# Patient Record
Sex: FEMALE | Race: WHITE | NOT HISPANIC OR LATINO | URBAN - NONMETROPOLITAN AREA
[De-identification: names, ages, dates, MRNs, and addresses within clinical notes are randomized per-mention and may not be internally consistent; named-entity substitution may affect disease eponyms.]

---

## 2020-08-18 ENCOUNTER — TELEMEDICINE (OUTPATIENT)
Dept: FAMILY MEDICINE CLINIC | Facility: CLINIC | Age: 60
End: 2020-08-18

## 2020-08-18 DIAGNOSIS — M25.551 RIGHT HIP PAIN: ICD-10-CM

## 2020-08-18 DIAGNOSIS — F17.210 CIGARETTE SMOKER MOTIVATED TO QUIT: ICD-10-CM

## 2020-08-18 DIAGNOSIS — G89.29 CHRONIC BILATERAL LOW BACK PAIN WITH BILATERAL SCIATICA: Primary | ICD-10-CM

## 2020-08-18 DIAGNOSIS — M54.42 CHRONIC BILATERAL LOW BACK PAIN WITH BILATERAL SCIATICA: Primary | ICD-10-CM

## 2020-08-18 DIAGNOSIS — M54.41 CHRONIC BILATERAL LOW BACK PAIN WITH BILATERAL SCIATICA: Primary | ICD-10-CM

## 2020-08-18 DIAGNOSIS — I10 ESSENTIAL HYPERTENSION: ICD-10-CM

## 2020-08-18 PROBLEM — S22.070A COMPRESSION FRACTURE OF T10 VERTEBRA: Status: ACTIVE | Noted: 2020-03-03

## 2020-08-18 PROBLEM — K86.89 PANCREATIC MASS: Status: ACTIVE | Noted: 2020-02-06

## 2020-08-18 PROCEDURE — 99203 OFFICE O/P NEW LOW 30 MIN: CPT | Performed by: FAMILY MEDICINE

## 2020-08-18 RX ORDER — HYDROCODONE BITARTRATE AND ACETAMINOPHEN 7.5; 325 MG/1; MG/1
1 TABLET ORAL EVERY 6 HOURS PRN
Qty: 120 TABLET | Refills: 0 | Status: SHIPPED | OUTPATIENT
Start: 2020-08-18 | End: 2020-09-21 | Stop reason: SDUPTHER

## 2020-08-18 RX ORDER — ALBUTEROL SULFATE 90 UG/1
2 AEROSOL, METERED RESPIRATORY (INHALATION)
COMMUNITY
Start: 2020-01-07 | End: 2021-01-07

## 2020-08-18 RX ORDER — RISEDRONATE SODIUM 150 MG/1
150 TABLET, FILM COATED ORAL
COMMUNITY
Start: 2020-08-12 | End: 2022-05-31

## 2020-08-18 RX ORDER — VARENICLINE TARTRATE 1 MG/1
1 TABLET, FILM COATED ORAL 2 TIMES DAILY
Qty: 60 TABLET | Refills: 5 | Status: SHIPPED | OUTPATIENT
Start: 2020-08-18 | End: 2021-02-22

## 2020-08-18 RX ORDER — MIRTAZAPINE 7.5 MG/1
7.5 TABLET, FILM COATED ORAL
COMMUNITY
Start: 2020-07-20 | End: 2022-05-03

## 2020-08-18 RX ORDER — HYDROCODONE BITARTRATE AND ACETAMINOPHEN 5; 325 MG/1; MG/1
1 TABLET ORAL EVERY 6 HOURS PRN
COMMUNITY
End: 2020-08-18

## 2020-08-18 RX ORDER — BUPROPION HYDROCHLORIDE 150 MG/1
150 TABLET ORAL DAILY
Qty: 30 TABLET | Refills: 5 | Status: SHIPPED | OUTPATIENT
Start: 2020-08-18 | End: 2020-09-21

## 2020-08-18 RX ORDER — METOPROLOL SUCCINATE 25 MG/1
12.5 TABLET, EXTENDED RELEASE ORAL
COMMUNITY
Start: 2020-01-07 | End: 2020-11-17 | Stop reason: SDUPTHER

## 2020-08-18 RX ORDER — ONDANSETRON 4 MG/1
4 TABLET, FILM COATED ORAL EVERY 8 HOURS PRN
COMMUNITY
End: 2021-10-20 | Stop reason: SDUPTHER

## 2020-08-18 NOTE — PROGRESS NOTES
Subjective   Carin Emmanuel is a 60 y.o. female.   Seen today by video visit due to the Covid-19 quarantine.   Patient with breast cancer, establishing care with me today.  Completed treatment for breast cancer almost 5 years ago now.  Recent mammogram was good, clear of cancer.    She is very depressed.  She took zoloft in the past but made her tired and did not help.  She responded very well to Wellbutrin years ago and wants to try this again.    She is having a lot of joint pain and recently her right  hip have really been bothering her. She had vertebral compression fractures of two thoracic vertebrae and had kyphoplasty.  She is scheduled to see the orthopedic surgeon about her hip, the same one who did her back surgery.    She wants to quit smoking and is wanting to try Chantix.     History of Present Illness    The following portions of the patient's history were reviewed and updated as appropriate: allergies, current medications, past family history, past medical history, past social history, past surgical history and problem list.    Review of Systems   Constitutional: Negative.    HENT: Negative.    Respiratory: Negative.  Negative for shortness of breath.    Cardiovascular: Negative.  Negative for chest pain.   Gastrointestinal: Negative.    Musculoskeletal: Negative.  Negative for myalgias.   Skin: Negative.    Allergic/Immunologic: Negative for immunocompromised state.   Neurological: Negative for dizziness, tremors, seizures, syncope, weakness and numbness.   Hematological: Negative.    Psychiatric/Behavioral: Negative for agitation, confusion, dysphoric mood and sleep disturbance. The patient is not nervous/anxious.      Objective   Physical Exam    Assessment/Plan   Carin was seen today for establish care.    Diagnoses and all orders for this visit:    Chronic bilateral low back pain with bilateral sciatica  -     HYDROcodone-acetaminophen (Norco) 7.5-325 MG per tablet; Take 1 tablet by mouth Every  6 (Six) Hours As Needed for Moderate Pain .    Right hip pain  -     HYDROcodone-acetaminophen (Norco) 7.5-325 MG per tablet; Take 1 tablet by mouth Every 6 (Six) Hours As Needed for Moderate Pain .    Cigarette smoker motivated to quit  -     varenicline (Chantix Starting Month Pak) 0.5 MG X 11 & 1 MG X 42 tablet; Take 0.5 mg one daily on days 1-3 and and 0.5 mg twice daily on days 4-7.Then 1 mg twice daily for a total of 12 weeks.  -     varenicline (Chantix Continuing Month Pak) 1 MG tablet; Take 1 tablet by mouth 2 (Two) Times a Day.    Essential hypertension    Other orders  -     buPROPion XL (Wellbutrin XL) 150 MG 24 hr tablet; Take 1 tablet by mouth Daily.    The patient has read and signed the [facility] Controlled Substance Contract.  I will continue to see patient for regular follow up appointments. Patient is well controlled on the medication.  SAIDA has been reviewed by me and is updated every 3 months. The patient is aware of the potential for addiction and dependence.      Will add Wellbutrin for depression as she responded well to this in the past    She will follow-up with oncology as scheduled regarding breast cancer    Chantix is started for smoking cessation    We will increase the Norco to 7.5 mg for multiple pain issues including the hip pain, back pain, and she will follow-up with orthopedics as scheduled.   Continue current antihypertensive medication    Continue Actonel along with calcium and vitamin D for osteoporosis.        This document has been electronically signed by Mari Bacon MD on August 18, 2020 12:21

## 2020-09-21 DIAGNOSIS — M54.42 CHRONIC BILATERAL LOW BACK PAIN WITH BILATERAL SCIATICA: ICD-10-CM

## 2020-09-21 DIAGNOSIS — M25.551 RIGHT HIP PAIN: ICD-10-CM

## 2020-09-21 DIAGNOSIS — G89.29 CHRONIC BILATERAL LOW BACK PAIN WITH BILATERAL SCIATICA: ICD-10-CM

## 2020-09-21 DIAGNOSIS — M54.41 CHRONIC BILATERAL LOW BACK PAIN WITH BILATERAL SCIATICA: ICD-10-CM

## 2020-09-21 RX ORDER — HYDROCODONE BITARTRATE AND ACETAMINOPHEN 7.5; 325 MG/1; MG/1
1 TABLET ORAL EVERY 6 HOURS PRN
Qty: 120 TABLET | Refills: 0 | Status: SHIPPED | OUTPATIENT
Start: 2020-09-21 | End: 2020-10-21 | Stop reason: SDUPTHER

## 2020-09-21 RX ORDER — BUPROPION HYDROCHLORIDE 300 MG/1
300 TABLET ORAL DAILY
Qty: 30 TABLET | Refills: 5 | Status: SHIPPED | OUTPATIENT
Start: 2020-09-21 | End: 2021-04-05 | Stop reason: SDUPTHER

## 2020-10-21 DIAGNOSIS — M54.41 CHRONIC BILATERAL LOW BACK PAIN WITH BILATERAL SCIATICA: ICD-10-CM

## 2020-10-21 DIAGNOSIS — G89.29 CHRONIC BILATERAL LOW BACK PAIN WITH BILATERAL SCIATICA: ICD-10-CM

## 2020-10-21 DIAGNOSIS — M54.42 CHRONIC BILATERAL LOW BACK PAIN WITH BILATERAL SCIATICA: ICD-10-CM

## 2020-10-21 DIAGNOSIS — M25.551 RIGHT HIP PAIN: ICD-10-CM

## 2020-10-21 RX ORDER — HYDROCODONE BITARTRATE AND ACETAMINOPHEN 7.5; 325 MG/1; MG/1
1 TABLET ORAL EVERY 6 HOURS PRN
Qty: 120 TABLET | Refills: 0 | Status: SHIPPED | OUTPATIENT
Start: 2020-10-21 | End: 2020-11-17 | Stop reason: SDUPTHER

## 2020-11-17 ENCOUNTER — TELEMEDICINE (OUTPATIENT)
Dept: FAMILY MEDICINE CLINIC | Facility: CLINIC | Age: 60
End: 2020-11-17

## 2020-11-17 DIAGNOSIS — M54.42 CHRONIC BILATERAL LOW BACK PAIN WITH BILATERAL SCIATICA: Primary | ICD-10-CM

## 2020-11-17 DIAGNOSIS — G89.29 CHRONIC BILATERAL LOW BACK PAIN WITH BILATERAL SCIATICA: Primary | ICD-10-CM

## 2020-11-17 DIAGNOSIS — M25.551 RIGHT HIP PAIN: ICD-10-CM

## 2020-11-17 DIAGNOSIS — K21.9 GASTROESOPHAGEAL REFLUX DISEASE, UNSPECIFIED WHETHER ESOPHAGITIS PRESENT: ICD-10-CM

## 2020-11-17 DIAGNOSIS — E78.2 MIXED HYPERLIPIDEMIA: ICD-10-CM

## 2020-11-17 DIAGNOSIS — M54.41 CHRONIC BILATERAL LOW BACK PAIN WITH BILATERAL SCIATICA: Primary | ICD-10-CM

## 2020-11-17 DIAGNOSIS — C50.512 MALIGNANT NEOPLASM OF LOWER-OUTER QUADRANT OF LEFT FEMALE BREAST, UNSPECIFIED ESTROGEN RECEPTOR STATUS (HCC): ICD-10-CM

## 2020-11-17 DIAGNOSIS — I10 ESSENTIAL HYPERTENSION: ICD-10-CM

## 2020-11-17 DIAGNOSIS — M81.0 OSTEOPOROSIS, UNSPECIFIED OSTEOPOROSIS TYPE, UNSPECIFIED PATHOLOGICAL FRACTURE PRESENCE: ICD-10-CM

## 2020-11-17 PROBLEM — J44.9 CHRONIC OBSTRUCTIVE PULMONARY DISEASE: Status: ACTIVE | Noted: 2020-08-20

## 2020-11-17 PROCEDURE — 99214 OFFICE O/P EST MOD 30 MIN: CPT | Performed by: FAMILY MEDICINE

## 2020-11-17 RX ORDER — SIMVASTATIN 20 MG
20 TABLET ORAL
COMMUNITY
Start: 2020-10-02 | End: 2023-03-09

## 2020-11-17 RX ORDER — ASPIRIN 81 MG/1
81 TABLET, CHEWABLE ORAL DAILY
COMMUNITY
Start: 2020-10-02 | End: 2021-10-03

## 2020-11-17 RX ORDER — PANTOPRAZOLE SODIUM 40 MG/1
TABLET, DELAYED RELEASE ORAL
COMMUNITY
Start: 2020-08-24

## 2020-11-17 RX ORDER — HYDROCODONE BITARTRATE AND ACETAMINOPHEN 7.5; 325 MG/1; MG/1
1 TABLET ORAL EVERY 6 HOURS PRN
Qty: 120 TABLET | Refills: 0 | Status: SHIPPED | OUTPATIENT
Start: 2020-11-17 | End: 2020-12-21 | Stop reason: SDUPTHER

## 2020-11-17 RX ORDER — METOPROLOL SUCCINATE 25 MG/1
12.5 TABLET, EXTENDED RELEASE ORAL DAILY
Qty: 30 TABLET | Refills: 5 | Status: SHIPPED | OUTPATIENT
Start: 2020-11-17 | End: 2022-04-04

## 2020-11-17 NOTE — PROGRESS NOTES
Subjective   Carin Emmanuel is a 60 y.o. female.   Seen today by video visit due to the Covid-19 quarantine.  Patient with breast cancer, osteoporosis.  History of several compression fractures.  Patient had back surgery earlier this year.  She is on pain medication, needs refills.  Has been noticing increasing pain that goes down her legs.  The pain starts in the low back and shoots down both legs.  Is been going on for at least 3 months.  Needs refills of medications for lipids, hypertension, and reflux.    History of Present Illness  Back Pain   This is a chronic problem. The current episode started more than 1 year ago. The problem occurs constantly. The problem is unchanged. The pain is present in the lumbar spine. The quality of the pain is described as shooting, stabbing and aching. The pain radiates down the back of both legs The pain is at a severity of 8/10. The pain is severe. The pain is the same all the time. The symptoms are aggravated by standing, twisting, position, bending and sitting. Stiffness is present at night, all day and in the morning. Associated symptoms include leg pain and tingling. Pertinent negatives include no abdominal pain, bladder incontinence, bowel incontinence, chest pain, dysuria, fever, headaches, numbness, paresis, paresthesias, pelvic pain, perianal numbness, weakness or weight loss.  Patient has tried analgesics, NSAIDs, muscle relaxant and heat for the symptoms. The treatment provided mild relief.     The following portions of the patient's history were reviewed and updated as appropriate: allergies, current medications, past family history, past medical history, past social history, past surgical history and problem list.    Review of Systems   Constitutional: Negative.    HENT: Negative.    Respiratory: Negative.  Negative for shortness of breath.    Cardiovascular: Negative.  Negative for chest pain.   Gastrointestinal: Negative.    Musculoskeletal: Negative.  Negative  for myalgias.   Skin: Negative.    Allergic/Immunologic: Negative for immunocompromised state.   Neurological: Negative for dizziness, tremors, seizures, syncope, weakness and numbness.   Hematological: Negative.    Psychiatric/Behavioral: Negative for agitation, confusion, dysphoric mood and sleep disturbance. The patient is not nervous/anxious.    All other systems reviewed and are negative.      Objective   Physical Exam    Assessment/Plan   Diagnoses and all orders for this visit:    1. Chronic bilateral low back pain with bilateral sciatica (Primary)  -     HYDROcodone-acetaminophen (Norco) 7.5-325 MG per tablet; Take 1 tablet by mouth Every 6 (Six) Hours As Needed for Moderate Pain .  Dispense: 120 tablet; Refill: 0    2. Right hip pain    3. Osteoporosis, unspecified osteoporosis type, unspecified pathological fracture presence    4. Malignant neoplasm of lower-outer quadrant of left female breast, unspecified estrogen receptor status (CMS/HCC)    5. Essential hypertension  -     metoprolol succinate XL (TOPROL-XL) 25 MG 24 hr tablet; Take 0.5 tablets by mouth Daily.  Dispense: 30 tablet; Refill: 5    6. Mixed hyperlipidemia    7. Gastroesophageal reflux disease, unspecified whether esophagitis present    The patient has read and signed the [facility] Controlled Substance Contract.  I will continue to see patient for regular follow up appointments. Patient is well controlled on the medication.  SAIDA has been reviewed by me and is updated every 3 months. The patient is aware of the potential for addiction and dependence.   Continue above listed medicines for reflux hypertension and hyperlipidemia    She will follow up with surgery regarding breast cancer    Continue Norco for pain management of above issues    Continue with current treatment for osteoporosis    We will get x-ray of the lumbar spine may need to consider further studies given history of previous compression fractures and  osteoporosis.        This document has been electronically signed by Mari Bacon MD on November 17, 2020 17:20 CST

## 2020-12-21 DIAGNOSIS — M54.41 CHRONIC BILATERAL LOW BACK PAIN WITH BILATERAL SCIATICA: ICD-10-CM

## 2020-12-21 DIAGNOSIS — G89.29 CHRONIC BILATERAL LOW BACK PAIN WITH BILATERAL SCIATICA: ICD-10-CM

## 2020-12-21 DIAGNOSIS — M54.42 CHRONIC BILATERAL LOW BACK PAIN WITH BILATERAL SCIATICA: ICD-10-CM

## 2020-12-21 RX ORDER — HYDROCODONE BITARTRATE AND ACETAMINOPHEN 7.5; 325 MG/1; MG/1
1 TABLET ORAL EVERY 6 HOURS PRN
Qty: 120 TABLET | Refills: 0 | Status: SHIPPED | OUTPATIENT
Start: 2020-12-21 | End: 2021-01-20 | Stop reason: SDUPTHER

## 2021-01-20 DIAGNOSIS — M54.41 CHRONIC BILATERAL LOW BACK PAIN WITH BILATERAL SCIATICA: ICD-10-CM

## 2021-01-20 DIAGNOSIS — G89.29 CHRONIC BILATERAL LOW BACK PAIN WITH BILATERAL SCIATICA: ICD-10-CM

## 2021-01-20 DIAGNOSIS — M54.42 CHRONIC BILATERAL LOW BACK PAIN WITH BILATERAL SCIATICA: ICD-10-CM

## 2021-01-20 RX ORDER — HYDROCODONE BITARTRATE AND ACETAMINOPHEN 7.5; 325 MG/1; MG/1
1 TABLET ORAL EVERY 6 HOURS PRN
Qty: 120 TABLET | Refills: 0 | Status: SHIPPED | OUTPATIENT
Start: 2021-01-20 | End: 2021-02-22

## 2021-02-22 ENCOUNTER — OFFICE VISIT (OUTPATIENT)
Dept: FAMILY MEDICINE CLINIC | Facility: CLINIC | Age: 61
End: 2021-02-22

## 2021-02-22 DIAGNOSIS — M54.42 CHRONIC BILATERAL LOW BACK PAIN WITH BILATERAL SCIATICA: ICD-10-CM

## 2021-02-22 DIAGNOSIS — D49.1 NEOPLASM OF UPPER LOBE OF RIGHT LUNG: ICD-10-CM

## 2021-02-22 DIAGNOSIS — C50.512 MALIGNANT NEOPLASM OF LOWER-OUTER QUADRANT OF LEFT FEMALE BREAST, UNSPECIFIED ESTROGEN RECEPTOR STATUS (HCC): Primary | ICD-10-CM

## 2021-02-22 DIAGNOSIS — G89.29 CHRONIC BILATERAL LOW BACK PAIN WITH BILATERAL SCIATICA: ICD-10-CM

## 2021-02-22 DIAGNOSIS — M54.41 CHRONIC BILATERAL LOW BACK PAIN WITH BILATERAL SCIATICA: ICD-10-CM

## 2021-02-22 PROBLEM — R91.1 LUNG NODULE: Status: ACTIVE | Noted: 2021-02-16

## 2021-02-22 PROCEDURE — 99442 PR PHYS/QHP TELEPHONE EVALUATION 11-20 MIN: CPT | Performed by: FAMILY MEDICINE

## 2021-02-22 RX ORDER — HYDROCODONE BITARTRATE AND ACETAMINOPHEN 7.5; 325 MG/1; MG/1
1 TABLET ORAL EVERY 6 HOURS PRN
Qty: 120 TABLET | Refills: 0 | Status: CANCELLED | OUTPATIENT
Start: 2021-02-22

## 2021-02-22 RX ORDER — ALBUTEROL SULFATE 90 UG/1
AEROSOL, METERED RESPIRATORY (INHALATION)
COMMUNITY
Start: 2020-12-29 | End: 2021-07-07 | Stop reason: SDUPTHER

## 2021-02-22 RX ORDER — HYDROCODONE BITARTRATE AND ACETAMINOPHEN 10; 325 MG/1; MG/1
1 TABLET ORAL EVERY 6 HOURS PRN
Qty: 120 TABLET | Refills: 0 | Status: SHIPPED | OUTPATIENT
Start: 2021-02-22 | End: 2021-03-22 | Stop reason: SDUPTHER

## 2021-02-22 RX ORDER — BUDESONIDE AND FORMOTEROL FUMARATE DIHYDRATE 160; 4.5 UG/1; UG/1
2 AEROSOL RESPIRATORY (INHALATION)
COMMUNITY
Start: 2021-02-16 | End: 2023-03-09

## 2021-02-22 NOTE — PROGRESS NOTES
Subjective   Carin Emmanuel is a 60 y.o. female.   This visit has been rescheduled as a phone visit to comply with patient safety concerns in accordance with CDC recommendations. Total time of discussion was 15 minutes.    You have chosen to receive care through a telephone visit. Do you consent to use a telephone visit for your medical care today? Yes   Patient with breast cancer, osteoporosis.  History of several compression fractures. Has had was found to have a pulmonary nodule and is scheduled for biopsy back surgery. Is on pain medication for this and feels like it is not helping as much anymore. Recently has been having a lung nodule followed and it is growing. It had intensely hypermetabolic activity strongly suspicious for malignancy and it is going to be biopsied on Friday of this week. We discussed increasing her pain medicine both for the back and for this.      History of Present Illness  Back Pain   This is a chronic problem. The current episode started more than 1 year ago. The problem occurs constantly. The problem is unchanged. The pain is present in the lumbar spine. The quality of the pain is described as shooting, stabbing and aching. The pain radiates down the back of both legs The pain is at a severity of 8/10. The pain is severe. The pain is the same all the time. The symptoms are aggravated by standing, twisting, position, bending and sitting. Stiffness is present at night, all day and in the morning. Associated symptoms include leg pain and tingling. Pertinent negatives include no abdominal pain, bladder incontinence, bowel incontinence, chest pain, dysuria, fever, headaches, numbness, paresis, paresthesias, pelvic pain, perianal numbness, weakness or weight loss.  Patient has tried analgesics, NSAIDs, muscle relaxant and heat for the symptoms. The treatment provided mild relief.     The following portions of the patient's history were reviewed and updated as appropriate: allergies, current  medications, past family history, past medical history, past social history, past surgical history and problem list.    Review of Systems   Constitutional: Negative.    HENT: Negative.    Respiratory: Negative.  Negative for shortness of breath.    Cardiovascular: Negative.  Negative for chest pain.   Gastrointestinal: Negative.    Musculoskeletal: Negative.  Negative for myalgias.   Skin: Negative.    Allergic/Immunologic: Negative for immunocompromised state.   Neurological: Negative for dizziness, tremors, seizures, syncope, weakness and numbness.   Hematological: Negative.    Psychiatric/Behavioral: Negative for agitation, confusion, dysphoric mood and sleep disturbance. The patient is not nervous/anxious.    All other systems reviewed and are negative.      Objective   Physical Exam    Assessment/Plan   Diagnoses and all orders for this visit:    1. Malignant neoplasm of lower-outer quadrant of left female breast, unspecified estrogen receptor status (CMS/HCC) (Primary)    2. Chronic bilateral low back pain with bilateral sciatica  -     HYDROcodone-acetaminophen (Norco)  MG per tablet; Take 1 tablet by mouth Every 6 (Six) Hours As Needed for Moderate Pain .  Dispense: 120 tablet; Refill: 0    3. Neoplasm of upper lobe of right lung    Other orders  -     Cancel: HYDROcodone-acetaminophen (Norco) 7.5-325 MG per tablet; Take 1 tablet by mouth Every 6 (Six) Hours As Needed for Moderate Pain .  Dispense: 120 tablet; Refill: 0    The patient has read and signed the [facility] Controlled Substance Contract.  I will continue to see patient for regular follow up appointments. Patient is well controlled on the medication.  SAIDA has been reviewed by me and is updated every 3 months. The patient is aware of the potential for addiction and dependence.   She will follow-up with pulmonology Friday for the biopsy. We'll increase hydrocodone to 10 mg 4 times a day for pain related to the procedure for suspected  lung malignancy. We'll await pathology. Also this is treating pain in her low back and will see her at least every 3 months and continue to follow along with pulmonology        This document has been electronically signed by Mari Bacon MD on February 22, 2021 09:32 CST

## 2021-03-22 DIAGNOSIS — M54.42 CHRONIC BILATERAL LOW BACK PAIN WITH BILATERAL SCIATICA: ICD-10-CM

## 2021-03-22 DIAGNOSIS — M54.41 CHRONIC BILATERAL LOW BACK PAIN WITH BILATERAL SCIATICA: ICD-10-CM

## 2021-03-22 DIAGNOSIS — G89.29 CHRONIC BILATERAL LOW BACK PAIN WITH BILATERAL SCIATICA: ICD-10-CM

## 2021-03-22 RX ORDER — HYDROCODONE BITARTRATE AND ACETAMINOPHEN 10; 325 MG/1; MG/1
1 TABLET ORAL EVERY 6 HOURS PRN
Qty: 120 TABLET | Refills: 0 | Status: SHIPPED | OUTPATIENT
Start: 2021-03-22 | End: 2021-04-21 | Stop reason: SDUPTHER

## 2021-04-05 RX ORDER — BUPROPION HYDROCHLORIDE 300 MG/1
300 TABLET ORAL DAILY
Qty: 30 TABLET | Refills: 5 | Status: SHIPPED | OUTPATIENT
Start: 2021-04-05 | End: 2021-10-04

## 2021-04-21 DIAGNOSIS — M54.42 CHRONIC BILATERAL LOW BACK PAIN WITH BILATERAL SCIATICA: ICD-10-CM

## 2021-04-21 DIAGNOSIS — M54.41 CHRONIC BILATERAL LOW BACK PAIN WITH BILATERAL SCIATICA: ICD-10-CM

## 2021-04-21 DIAGNOSIS — G89.29 CHRONIC BILATERAL LOW BACK PAIN WITH BILATERAL SCIATICA: ICD-10-CM

## 2021-04-21 RX ORDER — HYDROCODONE BITARTRATE AND ACETAMINOPHEN 10; 325 MG/1; MG/1
1 TABLET ORAL EVERY 6 HOURS PRN
Qty: 120 TABLET | Refills: 0 | Status: SHIPPED | OUTPATIENT
Start: 2021-04-21 | End: 2021-05-24 | Stop reason: SDUPTHER

## 2021-05-24 DIAGNOSIS — M54.42 CHRONIC BILATERAL LOW BACK PAIN WITH BILATERAL SCIATICA: ICD-10-CM

## 2021-05-24 DIAGNOSIS — G89.29 CHRONIC BILATERAL LOW BACK PAIN WITH BILATERAL SCIATICA: ICD-10-CM

## 2021-05-24 DIAGNOSIS — M54.41 CHRONIC BILATERAL LOW BACK PAIN WITH BILATERAL SCIATICA: ICD-10-CM

## 2021-05-24 RX ORDER — HYDROCODONE BITARTRATE AND ACETAMINOPHEN 10; 325 MG/1; MG/1
1 TABLET ORAL EVERY 6 HOURS PRN
Qty: 120 TABLET | Refills: 0 | Status: SHIPPED | OUTPATIENT
Start: 2021-05-24 | End: 2021-06-24 | Stop reason: SDUPTHER

## 2021-06-24 DIAGNOSIS — M54.41 CHRONIC BILATERAL LOW BACK PAIN WITH BILATERAL SCIATICA: ICD-10-CM

## 2021-06-24 DIAGNOSIS — M54.42 CHRONIC BILATERAL LOW BACK PAIN WITH BILATERAL SCIATICA: ICD-10-CM

## 2021-06-24 DIAGNOSIS — G89.29 CHRONIC BILATERAL LOW BACK PAIN WITH BILATERAL SCIATICA: ICD-10-CM

## 2021-06-24 RX ORDER — HYDROCODONE BITARTRATE AND ACETAMINOPHEN 10; 325 MG/1; MG/1
1 TABLET ORAL EVERY 6 HOURS PRN
Qty: 56 TABLET | Refills: 0 | Status: SHIPPED | OUTPATIENT
Start: 2021-06-24 | End: 2021-07-07 | Stop reason: SDUPTHER

## 2021-07-07 ENCOUNTER — OFFICE VISIT (OUTPATIENT)
Dept: FAMILY MEDICINE CLINIC | Facility: CLINIC | Age: 61
End: 2021-07-07

## 2021-07-07 VITALS — RESPIRATION RATE: 16 BRPM | HEIGHT: 66 IN

## 2021-07-07 DIAGNOSIS — J44.9 CHRONIC OBSTRUCTIVE PULMONARY DISEASE, UNSPECIFIED COPD TYPE (HCC): ICD-10-CM

## 2021-07-07 DIAGNOSIS — C34.11 PRIMARY CANCER OF RIGHT UPPER LOBE OF LUNG (HCC): Primary | ICD-10-CM

## 2021-07-07 DIAGNOSIS — M54.42 CHRONIC BILATERAL LOW BACK PAIN WITH BILATERAL SCIATICA: ICD-10-CM

## 2021-07-07 DIAGNOSIS — C50.512 MALIGNANT NEOPLASM OF LOWER-OUTER QUADRANT OF LEFT FEMALE BREAST, UNSPECIFIED ESTROGEN RECEPTOR STATUS (HCC): ICD-10-CM

## 2021-07-07 DIAGNOSIS — G89.29 CHRONIC BILATERAL LOW BACK PAIN WITH BILATERAL SCIATICA: ICD-10-CM

## 2021-07-07 DIAGNOSIS — M54.41 CHRONIC BILATERAL LOW BACK PAIN WITH BILATERAL SCIATICA: ICD-10-CM

## 2021-07-07 DIAGNOSIS — F33.1 MAJOR DEPRESSIVE DISORDER, RECURRENT EPISODE, MODERATE DEGREE (HCC): ICD-10-CM

## 2021-07-07 PROCEDURE — 99214 OFFICE O/P EST MOD 30 MIN: CPT | Performed by: FAMILY MEDICINE

## 2021-07-07 RX ORDER — ESCITALOPRAM OXALATE 5 MG/1
5 TABLET ORAL DAILY
Qty: 30 TABLET | Refills: 5 | Status: SHIPPED | OUTPATIENT
Start: 2021-07-07 | End: 2021-12-28

## 2021-07-07 RX ORDER — HYDROCODONE BITARTRATE AND ACETAMINOPHEN 10; 325 MG/1; MG/1
1 TABLET ORAL EVERY 6 HOURS PRN
Qty: 120 TABLET | Refills: 0 | Status: SHIPPED | OUTPATIENT
Start: 2021-07-07 | End: 2021-08-10 | Stop reason: SDUPTHER

## 2021-07-07 RX ORDER — ALBUTEROL SULFATE 90 UG/1
2 AEROSOL, METERED RESPIRATORY (INHALATION) EVERY 4 HOURS PRN
Qty: 1 G | Refills: 3 | Status: SHIPPED | OUTPATIENT
Start: 2021-07-07 | End: 2021-09-20

## 2021-07-07 NOTE — PROGRESS NOTES
Subjective   Carin Emmanuel is a 61 y.o. female.   Seen today by video visit due to the Covid-19 quarantine.  Patient with lung cancer, breast cancer, COPD, depression here for follow-up on these issues.  Still being followed by heme-onc, currently between cycles of chemotherapy.  She is here today for follow-up and refill of pain medications, Norco.  This does seem to be helping.  She is very depressed and also wants to address this.  She is on Wellbutrin and it seemed to work at first but is even up to 300 mg now and still has struggles with the depression    History of Present Illness  Depression   Visit Type: follow-up  Patient presents with the following symptoms: anhedonia, decreased concentration, depressed mood, excessive worry, fatigue, muscle tension and nervousness/anxiety.  Patient is not experiencing: chest pain, choking sensation, compulsions, confusion, dizziness, dry mouth, feelings of hopelessness, feelings of worthlessness, hypersomnia, hyperventilation, impotence, irritability, malaise, memory impairment, nausea, obsessions, palpitations, panic, psychomotor agitation, psychomotor retardation, restlessness, shortness of breath, suicidal ideas, suicidal planning, thoughts of death, weight gain and weight loss.  Frequency of symptoms: most days   Severity: causing significant distress   Sleep quality: fair  Nighttime awakenings: occasional  Compliance with medications:  %      The following portions of the patient's history were reviewed and updated as appropriate: allergies, current medications, past family history, past medical history, past social history, past surgical history and problem list.    Review of Systems   Constitutional: Negative.    HENT: Negative.    Respiratory: Negative.  Negative for shortness of breath.    Cardiovascular: Negative.  Negative for chest pain.   Gastrointestinal: Negative.    Musculoskeletal: Negative.  Negative for myalgias.   Skin: Negative.     Allergic/Immunologic: Negative for immunocompromised state.   Neurological: Negative for dizziness, tremors, seizures, syncope, weakness and numbness.   Hematological: Negative.    Psychiatric/Behavioral: Negative for agitation, confusion, dysphoric mood and sleep disturbance. The patient is not nervous/anxious.    All other systems reviewed and are negative.      Objective    There is no height or weight on file to calculate BMI.  Vitals:    07/07/21 0933   Resp: 16       Physical Exam  Vitals and nursing note reviewed.   Constitutional:       Appearance: She is well-developed.   HENT:      Head: Normocephalic and atraumatic.      Nose: Nose normal.   Eyes:      Conjunctiva/sclera: Conjunctivae normal.      Pupils: Pupils are equal, round, and reactive to light.   Neck:      Thyroid: No thyromegaly.      Vascular: No JVD.      Trachea: No tracheal deviation.   Cardiovascular:      Rate and Rhythm: Normal rate and regular rhythm.      Heart sounds: Normal heart sounds. No murmur heard.     Pulmonary:      Effort: Pulmonary effort is normal.      Breath sounds: Normal breath sounds. No wheezing.   Abdominal:      General: Bowel sounds are normal. There is no distension.      Palpations: Abdomen is soft.      Tenderness: There is no abdominal tenderness.   Musculoskeletal:         General: Normal range of motion.      Cervical back: Normal range of motion and neck supple.   Lymphadenopathy:      Cervical: No cervical adenopathy.   Skin:     General: Skin is warm and dry.      Findings: No rash.   Neurological:      Mental Status: She is alert and oriented to person, place, and time.      Coordination: Coordination normal.         Assessment/Plan   Diagnoses and all orders for this visit:    1. Primary cancer of right upper lobe of lung (CMS/HCC) (Primary)    2. Malignant neoplasm of lower-outer quadrant of left female breast, unspecified estrogen receptor status (CMS/HCC)    3. Chronic bilateral low back pain with  bilateral sciatica  -     HYDROcodone-acetaminophen (Norco)  MG per tablet; Take 1 tablet by mouth Every 6 (Six) Hours As Needed for Moderate Pain .  Dispense: 120 tablet; Refill: 0    4. Major depressive disorder, recurrent episode, moderate degree (CMS/HCC)  -     escitalopram (Lexapro) 5 MG tablet; Take 1 tablet by mouth Daily.  Dispense: 30 tablet; Refill: 5    5. Chronic obstructive pulmonary disease, unspecified COPD type (CMS/HCC)  -     Ventolin  (90 Base) MCG/ACT inhaler; Inhale 2 puffs Every 4 (Four) Hours As Needed for Wheezing.  Dispense: 1 g; Refill: 3    The patient has read and signed the [facility] Controlled Substance Contract.  I will continue to see patient for regular follow up appointments. Patient is well controlled on the medication.  SAIDA has been reviewed by me and is updated every 3 months. The patient is aware of the potential for addiction and dependence.   Continue above listed medicines for reflux hypertension and hyperlipidemia    She will follow up with surgery regarding breast cancer    Continue Norco for pain management of above issues    Continue follow-up with heme-onc regarding lung cancer.  Reviewed recent notes from them.    Continue Ventolin for COPD    We will add Lexapro along with the Wellbutrin for depression    I spent 30 minutes caring for Carin on this date of service. This time includes time spent by me in the following activities:preparing for the visit, obtaining and/or reviewing a separately obtained history, performing a medically appropriate examination and/or evaluation , counseling and educating the patient/family/caregiver, ordering medications, tests, or procedures and documenting information in the medical record          This document has been electronically signed by Mari Bacon MD on July 7, 2021 09:54 CDT

## 2021-08-10 DIAGNOSIS — G89.29 CHRONIC BILATERAL LOW BACK PAIN WITH BILATERAL SCIATICA: ICD-10-CM

## 2021-08-10 DIAGNOSIS — M54.41 CHRONIC BILATERAL LOW BACK PAIN WITH BILATERAL SCIATICA: ICD-10-CM

## 2021-08-10 DIAGNOSIS — M54.42 CHRONIC BILATERAL LOW BACK PAIN WITH BILATERAL SCIATICA: ICD-10-CM

## 2021-08-10 RX ORDER — HYDROCODONE BITARTRATE AND ACETAMINOPHEN 10; 325 MG/1; MG/1
1 TABLET ORAL EVERY 6 HOURS PRN
Qty: 120 TABLET | Refills: 0 | Status: SHIPPED | OUTPATIENT
Start: 2021-08-10 | End: 2021-09-09 | Stop reason: SDUPTHER

## 2021-08-11 ENCOUNTER — PRIOR AUTHORIZATION (OUTPATIENT)
Dept: FAMILY MEDICINE CLINIC | Facility: CLINIC | Age: 61
End: 2021-08-11

## 2021-08-11 NOTE — TELEPHONE ENCOUNTER
Carin Lien Key: [id number] - Rx #: [id number] help? Call us at [phone]  Outcome  Additional Information Required  A new prior authorization (PA) request cannot be started at this time because this member has other primary health insurance. Please contact the PA call center if you have questions on the status of this request.  Drug  HYDROcodone-Acetaminophen 10-325MG tablets  Form  [facility] Electronic PA Form (2017 [employer]

## 2021-09-09 DIAGNOSIS — M54.42 CHRONIC BILATERAL LOW BACK PAIN WITH BILATERAL SCIATICA: ICD-10-CM

## 2021-09-09 DIAGNOSIS — G89.29 CHRONIC BILATERAL LOW BACK PAIN WITH BILATERAL SCIATICA: ICD-10-CM

## 2021-09-09 DIAGNOSIS — M54.41 CHRONIC BILATERAL LOW BACK PAIN WITH BILATERAL SCIATICA: ICD-10-CM

## 2021-09-09 RX ORDER — HYDROCODONE BITARTRATE AND ACETAMINOPHEN 10; 325 MG/1; MG/1
1 TABLET ORAL EVERY 6 HOURS PRN
Qty: 120 TABLET | Refills: 0 | Status: SHIPPED | OUTPATIENT
Start: 2021-09-09 | End: 2021-10-12 | Stop reason: SDUPTHER

## 2021-09-20 DIAGNOSIS — J44.9 CHRONIC OBSTRUCTIVE PULMONARY DISEASE, UNSPECIFIED COPD TYPE (HCC): ICD-10-CM

## 2021-09-20 RX ORDER — ALBUTEROL SULFATE 90 UG/1
AEROSOL, METERED RESPIRATORY (INHALATION)
Qty: 18 G | Refills: 11 | Status: SHIPPED | OUTPATIENT
Start: 2021-09-20 | End: 2022-03-09

## 2021-10-04 RX ORDER — BUPROPION HYDROCHLORIDE 300 MG/1
300 TABLET ORAL DAILY
Qty: 30 TABLET | Refills: 5 | Status: SHIPPED | OUTPATIENT
Start: 2021-10-04 | End: 2022-03-21

## 2021-10-12 DIAGNOSIS — M54.41 CHRONIC BILATERAL LOW BACK PAIN WITH BILATERAL SCIATICA: ICD-10-CM

## 2021-10-12 DIAGNOSIS — M54.42 CHRONIC BILATERAL LOW BACK PAIN WITH BILATERAL SCIATICA: ICD-10-CM

## 2021-10-12 DIAGNOSIS — G89.29 CHRONIC BILATERAL LOW BACK PAIN WITH BILATERAL SCIATICA: ICD-10-CM

## 2021-10-12 RX ORDER — HYDROCODONE BITARTRATE AND ACETAMINOPHEN 10; 325 MG/1; MG/1
1 TABLET ORAL EVERY 6 HOURS PRN
Qty: 32 TABLET | Refills: 0 | Status: SHIPPED | OUTPATIENT
Start: 2021-10-12 | End: 2021-10-20 | Stop reason: SDUPTHER

## 2021-10-20 ENCOUNTER — OFFICE VISIT (OUTPATIENT)
Dept: FAMILY MEDICINE CLINIC | Facility: CLINIC | Age: 61
End: 2021-10-20

## 2021-10-20 VITALS
DIASTOLIC BLOOD PRESSURE: 68 MMHG | HEART RATE: 90 BPM | WEIGHT: 89.4 LBS | TEMPERATURE: 97.2 F | BODY MASS INDEX: 15.84 KG/M2 | SYSTOLIC BLOOD PRESSURE: 118 MMHG | HEIGHT: 63 IN

## 2021-10-20 DIAGNOSIS — F17.210 CIGARETTE SMOKER MOTIVATED TO QUIT: ICD-10-CM

## 2021-10-20 DIAGNOSIS — M54.42 CHRONIC BILATERAL LOW BACK PAIN WITH BILATERAL SCIATICA: ICD-10-CM

## 2021-10-20 DIAGNOSIS — G89.29 CHRONIC BILATERAL LOW BACK PAIN WITH BILATERAL SCIATICA: ICD-10-CM

## 2021-10-20 DIAGNOSIS — R91.1 LUNG NODULE: ICD-10-CM

## 2021-10-20 DIAGNOSIS — C50.512 MALIGNANT NEOPLASM OF LOWER-OUTER QUADRANT OF LEFT FEMALE BREAST, UNSPECIFIED ESTROGEN RECEPTOR STATUS (HCC): Primary | ICD-10-CM

## 2021-10-20 DIAGNOSIS — M54.41 CHRONIC BILATERAL LOW BACK PAIN WITH BILATERAL SCIATICA: ICD-10-CM

## 2021-10-20 PROBLEM — I48.91 ATRIAL FIBRILLATION (HCC): Status: ACTIVE | Noted: 2021-10-20

## 2021-10-20 PROCEDURE — 99214 OFFICE O/P EST MOD 30 MIN: CPT | Performed by: FAMILY MEDICINE

## 2021-10-20 RX ORDER — VARENICLINE TARTRATE 1 MG/1
1 TABLET, FILM COATED ORAL 2 TIMES DAILY
Qty: 60 TABLET | Refills: 5 | Status: SHIPPED | OUTPATIENT
Start: 2021-10-20 | End: 2022-07-25

## 2021-10-20 RX ORDER — HYDROCODONE BITARTRATE AND ACETAMINOPHEN 10; 325 MG/1; MG/1
1 TABLET ORAL EVERY 6 HOURS PRN
Qty: 120 TABLET | Refills: 0 | Status: SHIPPED | OUTPATIENT
Start: 2021-10-20 | End: 2021-11-18 | Stop reason: SDUPTHER

## 2021-10-20 RX ORDER — ONDANSETRON 4 MG/1
4 TABLET, FILM COATED ORAL EVERY 8 HOURS PRN
Qty: 30 TABLET | Refills: 2 | Status: SHIPPED | OUTPATIENT
Start: 2021-10-20 | End: 2022-02-01

## 2021-10-20 NOTE — PROGRESS NOTES
Subjective   Carin Emmanuel is a 61 y.o. female.   Patient with lung cancer, breast cancer, COPD, depression here for follow-up and needs a refill of her pain medication.  Recently had a biopsy of a right lung nodule which turned out to be benign.  I have the pathology results which we reviewed and discussed today.  She has appointments with her oncologist regularly.  Right now she is not receiving chemo or radiation.    She has some chronic dyspnea and weakness but overall feeling about her baseline right now.    She would like to quit smoking would like to try Chantix again.  History of Present Illness    The following portions of the patient's history were reviewed and updated as appropriate: allergies, current medications, past family history, past medical history, past social history, past surgical history and problem list.    Review of Systems   Constitutional: Negative.    HENT: Negative.    Respiratory: Negative.  Negative for shortness of breath.    Cardiovascular: Negative.  Negative for chest pain.   Gastrointestinal: Negative.    Musculoskeletal: Negative.  Negative for myalgias.   Skin: Negative.    Allergic/Immunologic: Negative for immunocompromised state.   Neurological: Negative for dizziness, tremors, seizures, syncope, weakness and numbness.   Hematological: Negative.    Psychiatric/Behavioral: Negative for agitation, confusion, dysphoric mood and sleep disturbance. The patient is not nervous/anxious.    All other systems reviewed and are negative.      Objective    Body mass index is 15.84 kg/m².  Vitals:    10/20/21 0827   BP: 118/68   Pulse: 90   Temp: 97.2 °F (36.2 °C)       Physical Exam  Vitals and nursing note reviewed.   Constitutional:       Appearance: She is well-developed.   HENT:      Head: Normocephalic and atraumatic.      Nose: Nose normal.   Eyes:      Conjunctiva/sclera: Conjunctivae normal.      Pupils: Pupils are equal, round, and reactive to light.   Neck:      Thyroid: No  thyromegaly.      Vascular: No JVD.      Trachea: No tracheal deviation.   Cardiovascular:      Rate and Rhythm: Normal rate and regular rhythm.      Heart sounds: Normal heart sounds. No murmur heard.      Pulmonary:      Effort: Pulmonary effort is normal.      Breath sounds: Normal breath sounds. No wheezing.   Abdominal:      General: Bowel sounds are normal. There is no distension.      Palpations: Abdomen is soft.      Tenderness: There is no abdominal tenderness.   Musculoskeletal:         General: Normal range of motion.      Cervical back: Normal range of motion and neck supple.   Lymphadenopathy:      Cervical: No cervical adenopathy.   Skin:     General: Skin is warm and dry.      Findings: No rash.   Neurological:      Mental Status: She is alert and oriented to person, place, and time.      Coordination: Coordination normal.         Assessment/Plan   Diagnoses and all orders for this visit:    1. Malignant neoplasm of lower-outer quadrant of left female breast, unspecified estrogen receptor status (HCC) (Primary)  -     ondansetron (ZOFRAN) 4 MG tablet; Take 1 tablet by mouth Every 8 (Eight) Hours As Needed for Nausea or Vomiting.  Dispense: 30 tablet; Refill: 2    2. Cigarette smoker motivated to quit  -     varenicline (Chantix Starting Month Phill) 0.5 MG X 11 & 1 MG X 42 tablet; Take 0.5 mg one daily on days 1-3 and and 0.5 mg twice daily on days 4-7.Then 1 mg twice daily for a total of 12 weeks.  Dispense: 53 tablet; Refill: 0  -     varenicline (Chantix Continuing Month Phill) 1 MG tablet; Take 1 tablet by mouth 2 (Two) Times a Day.  Dispense: 60 tablet; Refill: 5    3. Lung nodule    4. Chronic bilateral low back pain with bilateral sciatica  -     HYDROcodone-acetaminophen (Norco)  MG per tablet; Take 1 tablet by mouth Every 6 (Six) Hours As Needed for Moderate Pain .  Dispense: 120 tablet; Refill: 0    Other orders  -     Discontinue: varenicline (CHANTIX PHILL) 0.5 MG X 11 & 1 MG X 42 tablet;  Take 0.5 mg one daily on days 1-3 and and 0.5 mg twice daily on days 4-7.Then 1 mg twice daily for a total of 12 weeks.  Dispense: 53 tablet; Refill: 0    The patient has read and signed the [facility] Controlled Substance Contract.  I will continue to see patient for regular follow up appointments. Patient is well controlled on the medication.  SAIDA has been reviewed by me and is updated every 3 months. The patient is aware of the potential for addiction and dependence.     Continue Norco for pain management of above issues related to cancer pain and low back pain.    Continue follow-up with heme-onc regarding lung cancer.  Reviewed recent notes from them.    Chantix given for smoking cessation.    I spent 30 minutes caring for Carin on this date of service. This time includes time spent by me in the following activities:preparing for the visit, obtaining and/or reviewing a separately obtained history, performing a medically appropriate examination and/or evaluation , counseling and educating the patient/family/caregiver, ordering medications, tests, or procedures and documenting information in the medical record          This document has been electronically signed by Mari Bacon MD on October 20, 2021 08:54 CDT

## 2021-11-18 DIAGNOSIS — G89.29 CHRONIC BILATERAL LOW BACK PAIN WITH BILATERAL SCIATICA: ICD-10-CM

## 2021-11-18 DIAGNOSIS — M54.41 CHRONIC BILATERAL LOW BACK PAIN WITH BILATERAL SCIATICA: ICD-10-CM

## 2021-11-18 DIAGNOSIS — M54.42 CHRONIC BILATERAL LOW BACK PAIN WITH BILATERAL SCIATICA: ICD-10-CM

## 2021-11-18 RX ORDER — HYDROCODONE BITARTRATE AND ACETAMINOPHEN 10; 325 MG/1; MG/1
1 TABLET ORAL EVERY 6 HOURS PRN
Qty: 120 TABLET | Refills: 0 | Status: SHIPPED | OUTPATIENT
Start: 2021-11-18 | End: 2021-12-16 | Stop reason: SDUPTHER

## 2021-12-16 DIAGNOSIS — M54.42 CHRONIC BILATERAL LOW BACK PAIN WITH BILATERAL SCIATICA: ICD-10-CM

## 2021-12-16 DIAGNOSIS — G89.29 CHRONIC BILATERAL LOW BACK PAIN WITH BILATERAL SCIATICA: ICD-10-CM

## 2021-12-16 DIAGNOSIS — M54.41 CHRONIC BILATERAL LOW BACK PAIN WITH BILATERAL SCIATICA: ICD-10-CM

## 2021-12-16 RX ORDER — HYDROCODONE BITARTRATE AND ACETAMINOPHEN 10; 325 MG/1; MG/1
1 TABLET ORAL EVERY 6 HOURS PRN
Qty: 120 TABLET | Refills: 0 | Status: SHIPPED | OUTPATIENT
Start: 2021-12-16 | End: 2022-01-19 | Stop reason: SDUPTHER

## 2021-12-28 DIAGNOSIS — F33.1 MAJOR DEPRESSIVE DISORDER, RECURRENT EPISODE, MODERATE DEGREE (HCC): ICD-10-CM

## 2021-12-28 RX ORDER — ESCITALOPRAM OXALATE 5 MG/1
5 TABLET ORAL DAILY
Qty: 30 TABLET | Refills: 5 | Status: SHIPPED | OUTPATIENT
Start: 2021-12-28 | End: 2022-08-22

## 2022-01-19 DIAGNOSIS — M54.42 CHRONIC BILATERAL LOW BACK PAIN WITH BILATERAL SCIATICA: ICD-10-CM

## 2022-01-19 DIAGNOSIS — M54.41 CHRONIC BILATERAL LOW BACK PAIN WITH BILATERAL SCIATICA: ICD-10-CM

## 2022-01-19 DIAGNOSIS — G89.29 CHRONIC BILATERAL LOW BACK PAIN WITH BILATERAL SCIATICA: ICD-10-CM

## 2022-01-19 RX ORDER — HYDROCODONE BITARTRATE AND ACETAMINOPHEN 10; 325 MG/1; MG/1
1 TABLET ORAL EVERY 6 HOURS PRN
Qty: 120 TABLET | Refills: 0 | Status: SHIPPED | OUTPATIENT
Start: 2022-01-19 | End: 2022-02-24 | Stop reason: SDUPTHER

## 2022-02-01 DIAGNOSIS — C50.512 MALIGNANT NEOPLASM OF LOWER-OUTER QUADRANT OF LEFT FEMALE BREAST, UNSPECIFIED ESTROGEN RECEPTOR STATUS: ICD-10-CM

## 2022-02-01 RX ORDER — ONDANSETRON 4 MG/1
TABLET, FILM COATED ORAL
Qty: 30 TABLET | Refills: 2 | Status: SHIPPED | OUTPATIENT
Start: 2022-02-01 | End: 2022-05-15

## 2022-02-24 DIAGNOSIS — G89.29 CHRONIC BILATERAL LOW BACK PAIN WITH BILATERAL SCIATICA: ICD-10-CM

## 2022-02-24 DIAGNOSIS — M54.41 CHRONIC BILATERAL LOW BACK PAIN WITH BILATERAL SCIATICA: ICD-10-CM

## 2022-02-24 DIAGNOSIS — M54.42 CHRONIC BILATERAL LOW BACK PAIN WITH BILATERAL SCIATICA: ICD-10-CM

## 2022-02-24 RX ORDER — HYDROCODONE BITARTRATE AND ACETAMINOPHEN 10; 325 MG/1; MG/1
1 TABLET ORAL EVERY 6 HOURS PRN
Qty: 120 TABLET | Refills: 0 | Status: SHIPPED | OUTPATIENT
Start: 2022-02-24 | End: 2022-03-22 | Stop reason: SDUPTHER

## 2022-03-09 DIAGNOSIS — J44.9 CHRONIC OBSTRUCTIVE PULMONARY DISEASE, UNSPECIFIED COPD TYPE: ICD-10-CM

## 2022-03-09 RX ORDER — ALBUTEROL SULFATE 90 UG/1
AEROSOL, METERED RESPIRATORY (INHALATION)
Qty: 18 G | Refills: 11 | Status: SHIPPED | OUTPATIENT
Start: 2022-03-09 | End: 2022-09-29 | Stop reason: SDUPTHER

## 2022-03-21 RX ORDER — BUPROPION HYDROCHLORIDE 300 MG/1
300 TABLET ORAL DAILY
Qty: 30 TABLET | Refills: 5 | Status: SHIPPED | OUTPATIENT
Start: 2022-03-21 | End: 2022-09-19

## 2022-03-22 ENCOUNTER — OFFICE VISIT (OUTPATIENT)
Dept: FAMILY MEDICINE CLINIC | Facility: CLINIC | Age: 62
End: 2022-03-22

## 2022-03-22 VITALS
OXYGEN SATURATION: 95 % | TEMPERATURE: 97.8 F | HEART RATE: 78 BPM | SYSTOLIC BLOOD PRESSURE: 118 MMHG | DIASTOLIC BLOOD PRESSURE: 74 MMHG | WEIGHT: 91.4 LBS | BODY MASS INDEX: 16.2 KG/M2 | HEIGHT: 63 IN

## 2022-03-22 DIAGNOSIS — M25.551 RIGHT HIP PAIN: Primary | ICD-10-CM

## 2022-03-22 DIAGNOSIS — G89.29 CHRONIC BILATERAL LOW BACK PAIN WITH BILATERAL SCIATICA: ICD-10-CM

## 2022-03-22 DIAGNOSIS — M54.41 CHRONIC BILATERAL LOW BACK PAIN WITH BILATERAL SCIATICA: ICD-10-CM

## 2022-03-22 DIAGNOSIS — M54.42 CHRONIC BILATERAL LOW BACK PAIN WITH BILATERAL SCIATICA: ICD-10-CM

## 2022-03-22 PROCEDURE — 99214 OFFICE O/P EST MOD 30 MIN: CPT | Performed by: FAMILY MEDICINE

## 2022-03-22 RX ORDER — ASPIRIN 81 MG/1
TABLET, CHEWABLE ORAL
COMMUNITY
Start: 2022-02-22

## 2022-03-22 RX ORDER — HYDROCODONE BITARTRATE AND ACETAMINOPHEN 10; 325 MG/1; MG/1
1 TABLET ORAL EVERY 6 HOURS PRN
Qty: 120 TABLET | Refills: 0 | Status: SHIPPED | OUTPATIENT
Start: 2022-03-22 | End: 2022-04-25 | Stop reason: SDUPTHER

## 2022-03-22 RX ORDER — DOXEPIN HYDROCHLORIDE 10 MG/1
CAPSULE ORAL
COMMUNITY
Start: 2022-02-09

## 2022-03-22 NOTE — PROGRESS NOTES
Subjective   Carin Emmanuel is a 62 y.o. female.   Patient with lung cancer, breast cancer, COPD, depression here today for follow-up.  She is also having a lot of pain in the right leg and hip over the past few weeks.  It is worse at night but it is painful when she sits or when she stands up.  Its been difficult to walk at times.    She has also been a little more short of breath recently.  She saw her pulmonologist and is scheduled with cardiology in April.  Her recent chest CT was clear of cancer    Still would like to quit smoking.  Had been successful with Chantix in the past but recently when we wrote it the pharmacy has not been able to get it for her.    Hip Pain   Pertinent negatives include no numbness.       The following portions of the patient's history were reviewed and updated as appropriate: allergies, current medications, past family history, past medical history, past social history, past surgical history and problem list.    Review of Systems   Constitutional: Negative.    HENT: Negative.    Respiratory: Negative.  Negative for shortness of breath.    Cardiovascular: Negative.  Negative for chest pain.   Gastrointestinal: Negative.    Musculoskeletal: Negative.  Negative for myalgias.   Skin: Negative.    Allergic/Immunologic: Negative for immunocompromised state.   Neurological: Negative for dizziness, tremors, seizures, syncope, weakness and numbness.   Hematological: Negative.    Psychiatric/Behavioral: Negative for agitation, confusion, dysphoric mood and sleep disturbance. The patient is not nervous/anxious.    All other systems reviewed and are negative.      Objective    Body mass index is 16.19 kg/m².  Vitals:    03/22/22 0916   BP: 118/74   Pulse: 78   Temp: 97.8 °F (36.6 °C)   SpO2: 95%       Physical Exam  Vitals and nursing note reviewed.   Constitutional:       Appearance: She is well-developed.   HENT:      Head: Normocephalic and atraumatic.      Nose: Nose normal.   Eyes:       Conjunctiva/sclera: Conjunctivae normal.      Pupils: Pupils are equal, round, and reactive to light.   Neck:      Thyroid: No thyromegaly.      Vascular: No JVD.      Trachea: No tracheal deviation.   Cardiovascular:      Rate and Rhythm: Normal rate and regular rhythm.      Heart sounds: Normal heart sounds. No murmur heard.  Pulmonary:      Effort: Pulmonary effort is normal.      Breath sounds: Normal breath sounds. No wheezing.   Abdominal:      General: Bowel sounds are normal. There is no distension.      Palpations: Abdomen is soft.      Tenderness: There is no abdominal tenderness.   Musculoskeletal:         General: Normal range of motion.      Cervical back: Normal range of motion and neck supple.   Lymphadenopathy:      Cervical: No cervical adenopathy.   Skin:     General: Skin is warm and dry.      Findings: No rash.   Neurological:      Mental Status: She is alert and oriented to person, place, and time.      Coordination: Coordination normal.       PACS Images     Radiology Images    Study Result    Narrative & Impression   EXAMINATION:  XR HIP W OR WO PELVIS 2-3 VIEW RIGHT     CLINICAL HISTORY:  62 years Female,R hip pain, M25.551 Pain in  right hip     COMPARISON:  None     FINDINGS:  Mild degenerative arthritis about the hips with small  marginal hypertrophic osteophytes. Joint spaces are largely  preserved. No acute fracture. No dislocation.     Mild apex left lumbar curvature in the visualized lumbar spine.  Cholecystectomy.     IMPRESSION:  Mild degenerative arthritis about the hips without  acute bony abnormality.     Electronically signed by:  Jarod Coughlin MD  3/22/2022 10:17 AM  CDT Workstation: [id number]       Assessment/Plan   Diagnoses and all orders for this visit:    1. Right hip pain (Primary)  -     XR Hip With or Without Pelvis 2 - 3 View Right; Future    2. Chronic bilateral low back pain with bilateral sciatica  -     HYDROcodone-acetaminophen (Norco)  MG per tablet; Take 1  tablet by mouth Every 6 (Six) Hours As Needed for Moderate Pain .  Dispense: 120 tablet; Refill: 0    Other orders  -     diclofenac (VOLTAREN) 50 MG EC tablet; Take 1 tablet by mouth 2 (Two) Times a Day.  Dispense: 60 tablet; Refill: 5    The patient has read and signed the [facility] Controlled Substance Contract.  I will continue to see patient for regular follow up appointments. Patient is well controlled on the medication.  SAIDA has been reviewed by me and is updated every 3 months. The patient is aware of the potential for addiction and dependence.     Follow-up with cardiology regarding the shortness of breath.  Reviewed recent notes from her pulmonologist    We will continue Norco for pain issues which are multifactorial.  We will add diclofenac for the arthritis in the hips and if symptoms not improving consider orthopedic referral or further evaluation.    I spent 30 minutes caring for Carin on this date of service. This time includes time spent by me in the following activities:preparing for the visit, obtaining and/or reviewing a separately obtained history, performing a medically appropriate examination and/or evaluation , counseling and educating the patient/family/caregiver, ordering medications, tests, or procedures and documenting information in the medical record          This document has been electronically signed by Mari Bacon MD on March 22, 2022 09:47 CDT

## 2022-03-30 RX ORDER — CELECOXIB 200 MG/1
200 CAPSULE ORAL DAILY
Qty: 30 CAPSULE | Refills: 5 | Status: SHIPPED | OUTPATIENT
Start: 2022-03-30 | End: 2022-10-07

## 2022-04-03 DIAGNOSIS — I10 ESSENTIAL HYPERTENSION: ICD-10-CM

## 2022-04-04 RX ORDER — METOPROLOL SUCCINATE 25 MG/1
TABLET, EXTENDED RELEASE ORAL
Qty: 30 TABLET | Refills: 5 | Status: SHIPPED | OUTPATIENT
Start: 2022-04-04

## 2022-04-11 RX ORDER — PREDNISONE 10 MG/1
10 TABLET ORAL DAILY
Qty: 30 TABLET | Refills: 2 | Status: SHIPPED | OUTPATIENT
Start: 2022-04-11 | End: 2022-05-03

## 2022-04-25 DIAGNOSIS — G89.29 CHRONIC BILATERAL LOW BACK PAIN WITH BILATERAL SCIATICA: ICD-10-CM

## 2022-04-25 DIAGNOSIS — M54.42 CHRONIC BILATERAL LOW BACK PAIN WITH BILATERAL SCIATICA: ICD-10-CM

## 2022-04-25 DIAGNOSIS — M54.41 CHRONIC BILATERAL LOW BACK PAIN WITH BILATERAL SCIATICA: ICD-10-CM

## 2022-04-25 RX ORDER — HYDROCODONE BITARTRATE AND ACETAMINOPHEN 10; 325 MG/1; MG/1
1 TABLET ORAL EVERY 6 HOURS PRN
Qty: 120 TABLET | Refills: 0 | Status: SHIPPED | OUTPATIENT
Start: 2022-04-25 | End: 2022-05-03

## 2022-05-03 ENCOUNTER — OFFICE VISIT (OUTPATIENT)
Dept: FAMILY MEDICINE CLINIC | Facility: CLINIC | Age: 62
End: 2022-05-03

## 2022-05-03 VITALS
OXYGEN SATURATION: 90 % | WEIGHT: 89 LBS | HEART RATE: 92 BPM | TEMPERATURE: 98.5 F | HEIGHT: 63 IN | BODY MASS INDEX: 15.77 KG/M2

## 2022-05-03 DIAGNOSIS — L98.491 SKIN ULCER, LIMITED TO BREAKDOWN OF SKIN: Primary | ICD-10-CM

## 2022-05-03 DIAGNOSIS — C34.11 PRIMARY CANCER OF RIGHT UPPER LOBE OF LUNG: ICD-10-CM

## 2022-05-03 DIAGNOSIS — C50.512 MALIGNANT NEOPLASM OF LOWER-OUTER QUADRANT OF LEFT FEMALE BREAST, UNSPECIFIED ESTROGEN RECEPTOR STATUS: ICD-10-CM

## 2022-05-03 DIAGNOSIS — M80.00XS OSTEOPOROSIS WITH CURRENT PATHOLOGICAL FRACTURE, UNSPECIFIED OSTEOPOROSIS TYPE, SEQUELA: ICD-10-CM

## 2022-05-03 DIAGNOSIS — S22.060S COMPRESSION FRACTURE OF T7 VERTEBRA, SEQUELA: ICD-10-CM

## 2022-05-03 PROCEDURE — 99214 OFFICE O/P EST MOD 30 MIN: CPT | Performed by: FAMILY MEDICINE

## 2022-05-03 RX ORDER — HYDROMORPHONE HYDROCHLORIDE 4 MG/1
4 TABLET ORAL EVERY 6 HOURS PRN
Qty: 120 TABLET | Refills: 0 | Status: SHIPPED | OUTPATIENT
Start: 2022-05-03 | End: 2022-05-25 | Stop reason: SDUPTHER

## 2022-05-03 RX ORDER — METHOCARBAMOL 500 MG/1
TABLET, FILM COATED ORAL
COMMUNITY
Start: 2022-05-01 | End: 2023-02-23 | Stop reason: SDUPTHER

## 2022-05-03 NOTE — PROGRESS NOTES
Subjective   Carin Emmanuel is a 62 y.o. female.  Patient with non-small cell lung CA, left breast cancer, estrogen receptor negative here today with some wounds that have come up and will not heal.  She says they literally came up overnight with no injuries noted.  She feels like her circulation is poor in her left foot because she says he has no feeling in it.  And also broke out over the medial malleolus and just will not heal for over a month now.  She had a similar lesion that broke out over the right elbow and will not heal.  She said her cancer doctor saw them and prescribed Bactrim and mupirocin ointment and she has been applying these and cleaning with peroxide but it has not helped.    She has osteoporosis and has evidently had another vertebral compression fracture at T7.  She has had kyphoplasty x3 at T5, T9, and T10.    She has completed chemotherapy and radiation for the breast cancer, in 2015, and completed radiation therapy last year for the lung cancer.  She is still followed regularly by heme-onc    Hip Pain   Pertinent negatives include no numbness.   Skin Problem  This is a new problem. The current episode started more than 1 month ago. The problem occurs constantly. The problem has been unchanged. Associated symptoms include coughing and fatigue. Pertinent negatives include no chest pain, myalgias, numbness or weakness. Nothing aggravates the symptoms. Treatments tried: Bactrim and Bactroban. The treatment provided no relief.       The following portions of the patient's history were reviewed and updated as appropriate: allergies, current medications, past family history, past medical history, past social history, past surgical history and problem list.    Review of Systems   Constitutional: Positive for fatigue.   HENT: Negative.    Respiratory: Positive for cough. Negative for shortness of breath.    Cardiovascular: Negative.  Negative for chest pain.   Gastrointestinal: Negative.     Musculoskeletal: Negative.  Negative for myalgias.   Skin: Negative.    Allergic/Immunologic: Negative for immunocompromised state.   Neurological: Negative for dizziness, tremors, seizures, syncope, weakness and numbness.   Hematological: Negative.    Psychiatric/Behavioral: Negative for agitation, confusion, dysphoric mood and sleep disturbance. The patient is not nervous/anxious.    All other systems reviewed and are negative.      Objective    Body mass index is 15.77 kg/m².  Vitals:    05/03/22 1112   Pulse: 92   Temp: 98.5 °F (36.9 °C)   SpO2: 90%       Physical Exam  Vitals and nursing note reviewed.   Constitutional:       Appearance: She is well-developed. She is ill-appearing.      Comments: Patient appears very frail, thin   HENT:      Head: Normocephalic and atraumatic.      Nose: Nose normal.   Eyes:      Conjunctiva/sclera: Conjunctivae normal.      Pupils: Pupils are equal, round, and reactive to light.   Neck:      Thyroid: No thyromegaly.      Vascular: No JVD.      Trachea: No tracheal deviation.   Cardiovascular:      Rate and Rhythm: Normal rate and regular rhythm.      Heart sounds: Normal heart sounds. No murmur heard.  Pulmonary:      Effort: Pulmonary effort is normal.      Breath sounds: Normal breath sounds. No wheezing.   Abdominal:      General: Bowel sounds are normal. There is no distension.      Palpations: Abdomen is soft.      Tenderness: There is no abdominal tenderness.   Musculoskeletal:         General: Normal range of motion.      Cervical back: Normal range of motion and neck supple.   Lymphadenopathy:      Cervical: No cervical adenopathy.   Skin:     General: Skin is warm and dry.      Findings: No rash.      Comments: She has a dime sized shallow ulceration with some granulation tissue in the center on the left medial malleolus.  There is prominent redness surrounding the area but it is cool to touch, there is no induration.  She has very weak pedal pulses and her foot is  somewhat cool as well.    She has a smaller lesion about half the size on the right tip of the elbow with a similar appearance   Neurological:      Mental Status: She is alert and oriented to person, place, and time.      Coordination: Coordination normal.         Assessment/Plan   Diagnoses and all orders for this visit:    1. Skin ulcer, limited to breakdown of skin (HCC) (Primary)  -     XR Ankle 3+ View Left; Future  -     US Arterial Doppler Lower Extremity Complete  -     Ambulatory Referral to Wound Clinic    2. Malignant neoplasm of lower-outer quadrant of left female breast, unspecified estrogen receptor status (HCC)  -     HYDROmorphone (Dilaudid) 4 MG tablet; Take 1 tablet by mouth Every 6 (Six) Hours As Needed for Moderate Pain .  Dispense: 120 tablet; Refill: 0    3. Primary cancer of right upper lobe of lung (HCC)    4. Osteoporosis with current pathological fracture, unspecified osteoporosis type, sequela    5. Compression fracture of T7 vertebra, sequela    We will get an x-ray of the ankle as she has at least 2 primary cancers and want to be sure there is no bony metastasis that is causing this.    Will refer for lower extremity vascular studies and to wound care.  Certainly there is concern that this is an arterial insufficiency ulcer.  Advised patient to clean with something very gentle like baby shampoo and to just use a dry dressing until she sees wound care.    Will change her pain medication to Dilaudid as she has had some significantly increased pain with the compression fracture and she will follow-up with orthopedics.          This document has been electronically signed by Mari Bacon MD on May 3, 2022 11:36 CDT

## 2022-05-14 DIAGNOSIS — C50.512 MALIGNANT NEOPLASM OF LOWER-OUTER QUADRANT OF LEFT FEMALE BREAST, UNSPECIFIED ESTROGEN RECEPTOR STATUS: ICD-10-CM

## 2022-05-15 RX ORDER — ONDANSETRON 4 MG/1
TABLET, FILM COATED ORAL
Qty: 30 TABLET | Refills: 2 | Status: SHIPPED | OUTPATIENT
Start: 2022-05-15 | End: 2022-07-07

## 2022-05-19 ENCOUNTER — OFFICE VISIT (OUTPATIENT)
Dept: WOUND CARE | Facility: HOSPITAL | Age: 62
End: 2022-05-19

## 2022-05-19 PROCEDURE — G0463 HOSPITAL OUTPT CLINIC VISIT: HCPCS

## 2022-05-24 ENCOUNTER — OFFICE VISIT (OUTPATIENT)
Dept: WOUND CARE | Facility: HOSPITAL | Age: 62
End: 2022-05-24

## 2022-05-24 PROCEDURE — 97602 WOUND(S) CARE NON-SELECTIVE: CPT

## 2022-05-25 DIAGNOSIS — C50.512 MALIGNANT NEOPLASM OF LOWER-OUTER QUADRANT OF LEFT FEMALE BREAST, UNSPECIFIED ESTROGEN RECEPTOR STATUS: ICD-10-CM

## 2022-05-25 RX ORDER — HYDROMORPHONE HYDROCHLORIDE 4 MG/1
4 TABLET ORAL EVERY 6 HOURS PRN
Qty: 120 TABLET | Refills: 0 | Status: SHIPPED | OUTPATIENT
Start: 2022-05-25 | End: 2022-05-31

## 2022-05-31 DIAGNOSIS — G89.29 CHRONIC BILATERAL LOW BACK PAIN WITH BILATERAL SCIATICA: ICD-10-CM

## 2022-05-31 DIAGNOSIS — M54.42 CHRONIC BILATERAL LOW BACK PAIN WITH BILATERAL SCIATICA: ICD-10-CM

## 2022-05-31 DIAGNOSIS — M54.41 CHRONIC BILATERAL LOW BACK PAIN WITH BILATERAL SCIATICA: ICD-10-CM

## 2022-05-31 RX ORDER — ALENDRONATE SODIUM 70 MG/1
70 TABLET ORAL
Qty: 12 TABLET | Refills: 1 | Status: SHIPPED | OUTPATIENT
Start: 2022-05-31 | End: 2022-11-08

## 2022-05-31 RX ORDER — HYDROCODONE BITARTRATE AND ACETAMINOPHEN 10; 325 MG/1; MG/1
1 TABLET ORAL EVERY 6 HOURS PRN
Qty: 120 TABLET | Refills: 0 | Status: SHIPPED | OUTPATIENT
Start: 2022-05-31 | End: 2022-06-30 | Stop reason: SDUPTHER

## 2022-06-07 ENCOUNTER — TRANSCRIBE ORDERS (OUTPATIENT)
Dept: ADMINISTRATIVE | Facility: HOSPITAL | Age: 62
End: 2022-06-07

## 2022-06-07 DIAGNOSIS — R60.0 LEG EDEMA: Primary | ICD-10-CM

## 2022-06-07 DIAGNOSIS — R09.89 WEAK PULSE: ICD-10-CM

## 2022-06-08 ENCOUNTER — OFFICE VISIT (OUTPATIENT)
Dept: WOUND CARE | Facility: HOSPITAL | Age: 62
End: 2022-06-08

## 2022-06-14 ENCOUNTER — OFFICE VISIT (OUTPATIENT)
Dept: WOUND CARE | Facility: HOSPITAL | Age: 62
End: 2022-06-14

## 2022-06-21 ENCOUNTER — OFFICE VISIT (OUTPATIENT)
Dept: WOUND CARE | Facility: HOSPITAL | Age: 62
End: 2022-06-21

## 2022-06-29 ENCOUNTER — OFFICE VISIT (OUTPATIENT)
Dept: WOUND CARE | Facility: HOSPITAL | Age: 62
End: 2022-06-29

## 2022-06-30 ENCOUNTER — TELEPHONE (OUTPATIENT)
Dept: FAMILY MEDICINE CLINIC | Facility: CLINIC | Age: 62
End: 2022-06-30

## 2022-06-30 DIAGNOSIS — M54.42 CHRONIC BILATERAL LOW BACK PAIN WITH BILATERAL SCIATICA: ICD-10-CM

## 2022-06-30 DIAGNOSIS — G89.29 CHRONIC BILATERAL LOW BACK PAIN WITH BILATERAL SCIATICA: ICD-10-CM

## 2022-06-30 DIAGNOSIS — J44.9 CHRONIC OBSTRUCTIVE PULMONARY DISEASE, UNSPECIFIED COPD TYPE: ICD-10-CM

## 2022-06-30 DIAGNOSIS — M54.41 CHRONIC BILATERAL LOW BACK PAIN WITH BILATERAL SCIATICA: ICD-10-CM

## 2022-06-30 RX ORDER — ALBUTEROL SULFATE 2.5 MG/3ML
2.5 SOLUTION RESPIRATORY (INHALATION) EVERY 4 HOURS PRN
Qty: 360 ML | Refills: 12 | Status: SHIPPED | OUTPATIENT
Start: 2022-06-30

## 2022-06-30 RX ORDER — HYDROCODONE BITARTRATE AND ACETAMINOPHEN 10; 325 MG/1; MG/1
1 TABLET ORAL EVERY 6 HOURS PRN
Qty: 120 TABLET | Refills: 0 | Status: SHIPPED | OUTPATIENT
Start: 2022-06-30 | End: 2022-07-25 | Stop reason: SDUPTHER

## 2022-07-01 RX ORDER — PREDNISONE 10 MG/1
10 TABLET ORAL DAILY
Qty: 30 TABLET | Refills: 2 | OUTPATIENT
Start: 2022-07-01

## 2022-07-06 ENCOUNTER — OFFICE VISIT (OUTPATIENT)
Dept: WOUND CARE | Facility: HOSPITAL | Age: 62
End: 2022-07-06

## 2022-07-07 DIAGNOSIS — C50.512 MALIGNANT NEOPLASM OF LOWER-OUTER QUADRANT OF LEFT FEMALE BREAST, UNSPECIFIED ESTROGEN RECEPTOR STATUS: ICD-10-CM

## 2022-07-07 RX ORDER — ONDANSETRON 4 MG/1
TABLET, FILM COATED ORAL
Qty: 30 TABLET | Refills: 2 | Status: SHIPPED | OUTPATIENT
Start: 2022-07-07 | End: 2022-08-30

## 2022-07-13 ENCOUNTER — OFFICE VISIT (OUTPATIENT)
Dept: WOUND CARE | Facility: HOSPITAL | Age: 62
End: 2022-07-13

## 2022-07-20 ENCOUNTER — OFFICE VISIT (OUTPATIENT)
Dept: WOUND CARE | Facility: HOSPITAL | Age: 62
End: 2022-07-20

## 2022-07-25 ENCOUNTER — OFFICE VISIT (OUTPATIENT)
Dept: FAMILY MEDICINE CLINIC | Facility: CLINIC | Age: 62
End: 2022-07-25

## 2022-07-25 VITALS
OXYGEN SATURATION: 94 % | TEMPERATURE: 97.1 F | HEART RATE: 84 BPM | HEIGHT: 63 IN | DIASTOLIC BLOOD PRESSURE: 60 MMHG | SYSTOLIC BLOOD PRESSURE: 114 MMHG | BODY MASS INDEX: 15.84 KG/M2 | WEIGHT: 89.4 LBS

## 2022-07-25 DIAGNOSIS — C50.512 MALIGNANT NEOPLASM OF LOWER-OUTER QUADRANT OF LEFT FEMALE BREAST, UNSPECIFIED ESTROGEN RECEPTOR STATUS: ICD-10-CM

## 2022-07-25 DIAGNOSIS — J98.4 CAVITARY LESION OF LUNG: ICD-10-CM

## 2022-07-25 DIAGNOSIS — C34.11 PRIMARY CANCER OF RIGHT UPPER LOBE OF LUNG: ICD-10-CM

## 2022-07-25 DIAGNOSIS — M54.42 CHRONIC BILATERAL LOW BACK PAIN WITH BILATERAL SCIATICA: ICD-10-CM

## 2022-07-25 DIAGNOSIS — M54.41 CHRONIC BILATERAL LOW BACK PAIN WITH BILATERAL SCIATICA: ICD-10-CM

## 2022-07-25 DIAGNOSIS — J44.9 CHRONIC OBSTRUCTIVE PULMONARY DISEASE, UNSPECIFIED COPD TYPE: Primary | ICD-10-CM

## 2022-07-25 DIAGNOSIS — G89.29 CHRONIC BILATERAL LOW BACK PAIN WITH BILATERAL SCIATICA: ICD-10-CM

## 2022-07-25 PROBLEM — M48.50XA SPINAL COMPRESSION FRACTURE: Status: ACTIVE | Noted: 2022-05-26

## 2022-07-25 PROCEDURE — 99214 OFFICE O/P EST MOD 30 MIN: CPT | Performed by: FAMILY MEDICINE

## 2022-07-25 RX ORDER — HYDROCODONE BITARTRATE AND ACETAMINOPHEN 10; 325 MG/1; MG/1
1 TABLET ORAL EVERY 6 HOURS PRN
Qty: 120 TABLET | Refills: 0 | Status: SHIPPED | OUTPATIENT
Start: 2022-07-25 | End: 2022-08-31 | Stop reason: SDUPTHER

## 2022-07-25 RX ORDER — PREDNISONE 10 MG/1
20 TABLET ORAL 2 TIMES DAILY
Qty: 20 TABLET | Refills: 1 | Status: SHIPPED | OUTPATIENT
Start: 2022-07-25 | End: 2022-09-06 | Stop reason: SDUPTHER

## 2022-07-25 RX ORDER — CEFDINIR 300 MG/1
300 CAPSULE ORAL 2 TIMES DAILY
COMMUNITY
End: 2022-08-08

## 2022-07-25 NOTE — PROGRESS NOTES
Subjective   Carin Emmanuel is a 62 y.o. female.  Patient with non-small cell lung CA, left breast cancer, estrogen receptor negative, COPD here today after recent hospitalization.  She is on oxygen at 2 L.  She was admitted with hypercapnia hyponatremia and a COPD exacerbation, discharged with the same.  Patient is also being followed by pulmonology, Dr. Torrez, in [place] for right upper lobe cavitary lesion.  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  She is still short of breath very weak.  She said it just feels like she cannot breathe.    Hip Pain         The following portions of the patient's history were reviewed and updated as appropriate: allergies, current medications, past family history, past medical history, past social history, past surgical history and problem list.    Review of Systems   HENT: Negative.    Respiratory: Negative for shortness of breath.    Cardiovascular: Negative.    Gastrointestinal: Negative.    Musculoskeletal: Negative.    Skin: Negative.    Allergic/Immunologic: Negative for immunocompromised state.   Neurological: Negative for dizziness, tremors, seizures and syncope.   Hematological: Negative.    Psychiatric/Behavioral: Negative for agitation, confusion, dysphoric mood and sleep disturbance. The patient is not nervous/anxious.    All other systems reviewed and are negative.      Objective    Body mass index is 15.84 kg/m².  Vitals:    07/25/22 1000   BP: 114/60   Pulse: 84   Temp: 97.1 °F (36.2 °C)   SpO2: 94%       Physical Exam  Vitals and nursing note reviewed.   Constitutional:       Appearance: She is well-developed. She is ill-appearing.      Comments: Patient appears very frail, thin   HENT:      Head: Normocephalic and atraumatic.      Nose: Nose normal.   Eyes:      Conjunctiva/sclera: Conjunctivae normal.      Pupils: Pupils are equal, round, and reactive to  light.   Neck:      Thyroid: No thyromegaly.      Vascular: No JVD.      Trachea: No tracheal deviation.   Cardiovascular:      Rate and Rhythm: Normal rate and regular rhythm.      Heart sounds: Normal heart sounds. No murmur heard.  Pulmonary:      Effort: Pulmonary effort is normal.      Breath sounds: Normal breath sounds. No wheezing.   Abdominal:      General: Bowel sounds are normal. There is no distension.      Palpations: Abdomen is soft.      Tenderness: There is no abdominal tenderness.   Musculoskeletal:         General: Normal range of motion.      Cervical back: Normal range of motion and neck supple.   Lymphadenopathy:      Cervical: No cervical adenopathy.   Skin:     General: Skin is warm and dry.      Findings: No rash.   Neurological:      Mental Status: She is alert and oriented to person, place, and time.      Coordination: Coordination normal.         Assessment & Plan   Diagnoses and all orders for this visit:    1. Chronic obstructive pulmonary disease, unspecified COPD type (HCC) (Primary)    2. Chronic bilateral low back pain with bilateral sciatica  -     HYDROcodone-acetaminophen (Norco)  MG per tablet; Take 1 tablet by mouth Every 6 (Six) Hours As Needed for Moderate Pain .  Dispense: 120 tablet; Refill: 0    3. Malignant neoplasm of lower-outer quadrant of left female breast, unspecified estrogen receptor status (HCC)    4. Cavitary lesion of lung    5. Primary cancer of right upper lobe of lung (HCC)    Other orders  -     predniSONE (DELTASONE) 10 MG tablet; Take 2 tablets by mouth 2 (Two) Times a Day.  Dispense: 20 tablet; Refill: 1    Give a few more days of prednisone as this did help with her breathing and she is very uncomfortable being unable to get her breath.  She has a couple days of cefdinir to finish out and she did finish azithromycin that she was sent home from the hospital on.  She will follow-up with pulmonology regarding the cavitary lesion, lung cancer history.   We will continue with current regimen of inhalers, nebulizers for the COPD.  We will continue on hydrocodone for pain related issues including back pain and cancer pains    The patient has read and signed the [facility] Controlled Substance Contract.  I will continue to see patient for regular follow up appointments. Patient is well controlled on the medication.  SAIDA has been reviewed by me and is updated every 3 months. The patient is aware of the potential for addiction and dependence.             This document has been electronically signed by Mari Bacon MD on July 25, 2022 11:03 CDT

## 2022-07-27 ENCOUNTER — OFFICE VISIT (OUTPATIENT)
Dept: WOUND CARE | Facility: HOSPITAL | Age: 62
End: 2022-07-27

## 2022-07-27 PROCEDURE — G0463 HOSPITAL OUTPT CLINIC VISIT: HCPCS

## 2022-08-03 ENCOUNTER — OFFICE VISIT (OUTPATIENT)
Dept: WOUND CARE | Facility: HOSPITAL | Age: 62
End: 2022-08-03

## 2022-08-08 ENCOUNTER — TELEPHONE (OUTPATIENT)
Dept: FAMILY MEDICINE CLINIC | Facility: CLINIC | Age: 62
End: 2022-08-08

## 2022-08-08 RX ORDER — AZITHROMYCIN 250 MG/1
250 TABLET, FILM COATED ORAL DAILY
COMMUNITY
Start: 2022-07-28 | End: 2022-12-08

## 2022-08-08 RX ORDER — RIFAMPIN 150 MG/1
450 CAPSULE ORAL DAILY
COMMUNITY
Start: 2022-07-28 | End: 2022-08-28

## 2022-08-08 NOTE — TELEPHONE ENCOUNTER
She called to let you know the 3rd antibiotic she is taking is ethambutol 600mg and then she'll also be starting iv antibiotics.. amikacin

## 2022-08-10 ENCOUNTER — OFFICE VISIT (OUTPATIENT)
Dept: WOUND CARE | Facility: HOSPITAL | Age: 62
End: 2022-08-10

## 2022-08-17 ENCOUNTER — OFFICE VISIT (OUTPATIENT)
Dept: WOUND CARE | Facility: HOSPITAL | Age: 62
End: 2022-08-17

## 2022-08-21 DIAGNOSIS — F33.1 MAJOR DEPRESSIVE DISORDER, RECURRENT EPISODE, MODERATE DEGREE: ICD-10-CM

## 2022-08-22 RX ORDER — ESCITALOPRAM OXALATE 5 MG/1
5 TABLET ORAL DAILY
Qty: 30 TABLET | Refills: 5 | Status: SHIPPED | OUTPATIENT
Start: 2022-08-22 | End: 2022-11-17

## 2022-08-24 ENCOUNTER — OFFICE VISIT (OUTPATIENT)
Dept: WOUND CARE | Facility: HOSPITAL | Age: 62
End: 2022-08-24

## 2022-08-24 ENCOUNTER — TELEPHONE (OUTPATIENT)
Dept: FAMILY MEDICINE CLINIC | Facility: CLINIC | Age: 62
End: 2022-08-24

## 2022-08-24 NOTE — TELEPHONE ENCOUNTER
----- Message from Mari Bacon MD sent at 8/24/2022  4:03 PM CDT -----  Tell her this is something that happens commonly with lung cancer.  Looking back, when her cancer was active the sodium levels are actually much lower than they are now.  No lower than it is, I do not think she needs to go to the hospital to get infusions.  I would just add more sodium to the diet and so we will check a little  She comes in    ----- Message -----  From: Cleopatra Villalba MA  Sent: 8/24/2022   3:18 PM CDT  To: Mari Bacon MD    Patient called and is wanting to know if your will look ar her labs and see if she should be taking something for sodium

## 2022-08-30 DIAGNOSIS — C50.512 MALIGNANT NEOPLASM OF LOWER-OUTER QUADRANT OF LEFT FEMALE BREAST, UNSPECIFIED ESTROGEN RECEPTOR STATUS: ICD-10-CM

## 2022-08-30 RX ORDER — ONDANSETRON 4 MG/1
TABLET, FILM COATED ORAL
Qty: 30 TABLET | Refills: 0 | Status: SHIPPED | OUTPATIENT
Start: 2022-08-30

## 2022-08-31 ENCOUNTER — OFFICE VISIT (OUTPATIENT)
Dept: WOUND CARE | Facility: HOSPITAL | Age: 62
End: 2022-08-31

## 2022-08-31 ENCOUNTER — PRIOR AUTHORIZATION (OUTPATIENT)
Dept: FAMILY MEDICINE CLINIC | Facility: CLINIC | Age: 62
End: 2022-08-31

## 2022-08-31 DIAGNOSIS — M54.42 CHRONIC BILATERAL LOW BACK PAIN WITH BILATERAL SCIATICA: ICD-10-CM

## 2022-08-31 DIAGNOSIS — M54.41 CHRONIC BILATERAL LOW BACK PAIN WITH BILATERAL SCIATICA: ICD-10-CM

## 2022-08-31 DIAGNOSIS — G89.29 CHRONIC BILATERAL LOW BACK PAIN WITH BILATERAL SCIATICA: ICD-10-CM

## 2022-08-31 RX ORDER — HYDROCODONE BITARTRATE AND ACETAMINOPHEN 10; 325 MG/1; MG/1
1 TABLET ORAL EVERY 6 HOURS PRN
Qty: 120 TABLET | Refills: 0 | Status: SHIPPED | OUTPATIENT
Start: 2022-08-31 | End: 2022-09-28 | Stop reason: SDUPTHER

## 2022-08-31 NOTE — TELEPHONE ENCOUNTER
Patient called requesting refill for Hydrocodone  mg. Requested pharmacy [facility] in [place].

## 2022-08-31 NOTE — TELEPHONE ENCOUNTER
Carin Emmanuel Ryan: [id number] - PA Case ID: [id number] - Rx #: [id number] help? Call us at [phone]  Outcome  Approvedtoday  PA Case: [id number], Status: Approved, Coverage Starts on: 8/31/2022 12:00:00 AM, Coverage Ends on: 2/27/2023 12:00:00 AM.  Drug  HYDROcodone-Acetaminophen 10-325MG tablets  Form  [employer]

## 2022-09-06 RX ORDER — PREDNISONE 10 MG/1
20 TABLET ORAL 2 TIMES DAILY
Qty: 20 TABLET | Refills: 1 | Status: SHIPPED | OUTPATIENT
Start: 2022-09-06 | End: 2022-09-20

## 2022-09-07 ENCOUNTER — OFFICE VISIT (OUTPATIENT)
Dept: WOUND CARE | Facility: HOSPITAL | Age: 62
End: 2022-09-07

## 2022-09-14 ENCOUNTER — OFFICE VISIT (OUTPATIENT)
Dept: WOUND CARE | Facility: HOSPITAL | Age: 62
End: 2022-09-14

## 2022-09-16 DIAGNOSIS — F33.1 MAJOR DEPRESSIVE DISORDER, RECURRENT EPISODE, MODERATE DEGREE: Primary | ICD-10-CM

## 2022-09-19 RX ORDER — BUPROPION HYDROCHLORIDE 300 MG/1
300 TABLET ORAL DAILY
Qty: 30 TABLET | Refills: 0 | Status: SHIPPED | OUTPATIENT
Start: 2022-09-19 | End: 2022-10-10

## 2022-09-20 RX ORDER — PREDNISONE 10 MG/1
TABLET ORAL
Qty: 20 TABLET | Refills: 1 | Status: SHIPPED | OUTPATIENT
Start: 2022-09-20 | End: 2022-11-02

## 2022-09-21 ENCOUNTER — OFFICE VISIT (OUTPATIENT)
Dept: WOUND CARE | Facility: HOSPITAL | Age: 62
End: 2022-09-21

## 2022-09-21 PROCEDURE — G0463 HOSPITAL OUTPT CLINIC VISIT: HCPCS

## 2022-09-26 DIAGNOSIS — J44.9 CHRONIC OBSTRUCTIVE PULMONARY DISEASE, UNSPECIFIED COPD TYPE: ICD-10-CM

## 2022-09-26 RX ORDER — ALBUTEROL SULFATE 90 UG/1
AEROSOL, METERED RESPIRATORY (INHALATION)
Qty: 18 G | Refills: 11 | OUTPATIENT
Start: 2022-09-26

## 2022-09-28 ENCOUNTER — OFFICE VISIT (OUTPATIENT)
Dept: WOUND CARE | Facility: HOSPITAL | Age: 62
End: 2022-09-28

## 2022-09-28 DIAGNOSIS — M54.41 CHRONIC BILATERAL LOW BACK PAIN WITH BILATERAL SCIATICA: ICD-10-CM

## 2022-09-28 DIAGNOSIS — G89.29 CHRONIC BILATERAL LOW BACK PAIN WITH BILATERAL SCIATICA: ICD-10-CM

## 2022-09-28 DIAGNOSIS — M54.42 CHRONIC BILATERAL LOW BACK PAIN WITH BILATERAL SCIATICA: ICD-10-CM

## 2022-09-28 RX ORDER — HYDROCODONE BITARTRATE AND ACETAMINOPHEN 10; 325 MG/1; MG/1
1 TABLET ORAL EVERY 6 HOURS PRN
Qty: 120 TABLET | Refills: 0 | Status: SHIPPED | OUTPATIENT
Start: 2022-09-28 | End: 2022-10-27 | Stop reason: SDUPTHER

## 2022-09-28 NOTE — TELEPHONE ENCOUNTER
Patient called office requesting refill on Hydrocodone and her inhaler. She should have refills left on inhaler. Requested pharmacy [facility] in [place].

## 2022-09-29 DIAGNOSIS — J44.9 CHRONIC OBSTRUCTIVE PULMONARY DISEASE, UNSPECIFIED COPD TYPE: ICD-10-CM

## 2022-09-29 RX ORDER — ALBUTEROL SULFATE 90 UG/1
2 AEROSOL, METERED RESPIRATORY (INHALATION) EVERY 4 HOURS PRN
Qty: 18 G | Refills: 11 | Status: SHIPPED | OUTPATIENT
Start: 2022-09-29 | End: 2023-03-21

## 2022-09-29 NOTE — TELEPHONE ENCOUNTER
Patient called requesting refill on albuterol inhaler. Requested to [facility].

## 2022-10-05 ENCOUNTER — OFFICE VISIT (OUTPATIENT)
Dept: WOUND CARE | Facility: HOSPITAL | Age: 62
End: 2022-10-05

## 2022-10-07 RX ORDER — CELECOXIB 200 MG/1
200 CAPSULE ORAL DAILY
Qty: 30 CAPSULE | Refills: 5 | Status: SHIPPED | OUTPATIENT
Start: 2022-10-07 | End: 2023-03-09

## 2022-10-10 DIAGNOSIS — F33.1 MAJOR DEPRESSIVE DISORDER, RECURRENT EPISODE, MODERATE DEGREE: ICD-10-CM

## 2022-10-10 RX ORDER — BUPROPION HYDROCHLORIDE 300 MG/1
300 TABLET ORAL DAILY
Qty: 90 TABLET | Refills: 1 | Status: SHIPPED | OUTPATIENT
Start: 2022-10-10 | End: 2023-04-03

## 2022-10-19 ENCOUNTER — OFFICE VISIT (OUTPATIENT)
Dept: WOUND CARE | Facility: HOSPITAL | Age: 62
End: 2022-10-19

## 2022-10-19 RX ORDER — PREDNISONE 10 MG/1
20 TABLET ORAL 2 TIMES DAILY
Qty: 20 TABLET | Refills: 1 | OUTPATIENT
Start: 2022-10-19

## 2022-10-27 DIAGNOSIS — M54.41 CHRONIC BILATERAL LOW BACK PAIN WITH BILATERAL SCIATICA: ICD-10-CM

## 2022-10-27 DIAGNOSIS — G89.29 CHRONIC BILATERAL LOW BACK PAIN WITH BILATERAL SCIATICA: ICD-10-CM

## 2022-10-27 DIAGNOSIS — M54.42 CHRONIC BILATERAL LOW BACK PAIN WITH BILATERAL SCIATICA: ICD-10-CM

## 2022-10-27 RX ORDER — HYDROCODONE BITARTRATE AND ACETAMINOPHEN 10; 325 MG/1; MG/1
1 TABLET ORAL EVERY 6 HOURS PRN
Qty: 120 TABLET | Refills: 0 | Status: SHIPPED | OUTPATIENT
Start: 2022-10-27 | End: 2022-12-15 | Stop reason: SDUPTHER

## 2022-11-02 ENCOUNTER — OFFICE VISIT (OUTPATIENT)
Dept: WOUND CARE | Facility: HOSPITAL | Age: 62
End: 2022-11-02

## 2022-11-02 RX ORDER — PREDNISONE 10 MG/1
TABLET ORAL
Qty: 20 TABLET | Refills: 1 | Status: SHIPPED | OUTPATIENT
Start: 2022-11-02 | End: 2023-01-24

## 2022-11-08 RX ORDER — ALENDRONATE SODIUM 70 MG/1
TABLET ORAL
Qty: 12 TABLET | Refills: 1 | Status: SHIPPED | OUTPATIENT
Start: 2022-11-08 | End: 2023-03-09

## 2022-11-17 ENCOUNTER — OFFICE VISIT (OUTPATIENT)
Dept: FAMILY MEDICINE CLINIC | Facility: CLINIC | Age: 62
End: 2022-11-17

## 2022-11-17 VITALS
TEMPERATURE: 97.8 F | BODY MASS INDEX: 16.66 KG/M2 | WEIGHT: 94 LBS | HEART RATE: 83 BPM | SYSTOLIC BLOOD PRESSURE: 110 MMHG | DIASTOLIC BLOOD PRESSURE: 62 MMHG | HEIGHT: 63 IN | OXYGEN SATURATION: 97 %

## 2022-11-17 DIAGNOSIS — F33.1 MAJOR DEPRESSIVE DISORDER, RECURRENT EPISODE, MODERATE DEGREE: ICD-10-CM

## 2022-11-17 DIAGNOSIS — C34.11 PRIMARY CANCER OF RIGHT UPPER LOBE OF LUNG: ICD-10-CM

## 2022-11-17 DIAGNOSIS — J44.9 CHRONIC OBSTRUCTIVE PULMONARY DISEASE, UNSPECIFIED COPD TYPE: ICD-10-CM

## 2022-11-17 DIAGNOSIS — M95.4 STERNAL DEFORMITY: Primary | ICD-10-CM

## 2022-11-17 PROCEDURE — 99214 OFFICE O/P EST MOD 30 MIN: CPT | Performed by: FAMILY MEDICINE

## 2022-11-17 RX ORDER — COLCHICINE 0.6 MG/1
0.6 TABLET ORAL
COMMUNITY
Start: 2022-10-26 | End: 2023-03-09

## 2022-11-17 RX ORDER — LOSARTAN POTASSIUM 25 MG/1
25 TABLET ORAL DAILY
COMMUNITY
Start: 2022-10-26 | End: 2023-10-27

## 2022-11-17 RX ORDER — OXYCODONE HYDROCHLORIDE 15 MG/1
15 TABLET ORAL EVERY 4 HOURS PRN
Qty: 180 TABLET | Refills: 0 | Status: SHIPPED | OUTPATIENT
Start: 2022-11-17 | End: 2022-12-15 | Stop reason: SDUPTHER

## 2022-11-17 RX ORDER — RIFAMPIN 150 MG/1
300 CAPSULE ORAL
COMMUNITY
Start: 2022-10-31

## 2022-11-17 RX ORDER — MAGNESIUM OXIDE 400 MG/1
400 TABLET ORAL DAILY
COMMUNITY
Start: 2022-05-03

## 2022-11-17 RX ORDER — ETHAMBUTOL HYDROCHLORIDE 400 MG/1
600 TABLET, FILM COATED ORAL DAILY
COMMUNITY
Start: 2022-07-28 | End: 2023-08-23

## 2022-11-17 NOTE — PROGRESS NOTES
Subjective   Carin Emmanuel is a 62 y.o. female.  Patient with non-small cell lung CA, left breast cancer, estrogen receptor negative here today with some increasing dyspnea, recent lung infection.  Had a CT that showed a right lower lobe spiculated lung nodule.  She has been also diagnosed with non-TB mycobacterial infection and has been on IV antibiotics along with oral Zithromax rifampin and ethambutol.  She is still completing this course of medications.  She is still extremely short of breath.  She says that she is very limited and can only move around minimally without becoming so short of breath she cannot function.  She is extremely depressed over this.  Says she does not want to live this way.  Her pulmonologist has told her that the breathing may improve once the infection has cleared but it is a prolonged course.  She is on Wellbutrin for depression and would like to see about trying something else.  Had been on Lexapro which did not help.    She has noticed a deformity in her anterior chest wall/sternal area.  It seems to have developed a knot or bulge in the center.  She is having increasing pain in this area as well.  The current pain medicine is just not helping anymore.    History of Present Illness    The following portions of the patient's history were reviewed and updated as appropriate: allergies, current medications, past family history, past medical history, past social history, past surgical history and problem list.    Review of Systems   HENT: Negative.    Respiratory: Negative for shortness of breath.    Cardiovascular: Negative.    Gastrointestinal: Negative.    Musculoskeletal: Negative.    Skin: Negative.    Allergic/Immunologic: Negative for immunocompromised state.   Neurological: Negative for dizziness, tremors, seizures and syncope.   Hematological: Negative.    Psychiatric/Behavioral: Negative for agitation, confusion, dysphoric mood and sleep disturbance. The patient is not  nervous/anxious.    All other systems reviewed and are negative.      Objective    Body mass index is 16.65 kg/m².  Vitals:    11/17/22 1323   BP: 110/62   Pulse: 83   Temp: 97.8 °F (36.6 °C)   SpO2: 97%   Answers for HPI/ROS submitted by the patient on 11/13/2022  Please describe your symptoms.: Follow up after hospitalization. New lump in chest  Have you had these symptoms before?: No  How long have you been having these symptoms?: 1-2 weeks  Please describe any probable cause for these symptoms. : Unknown  What is the primary reason for your visit?: Other        Physical Exam  Vitals and nursing note reviewed.   Constitutional:       Appearance: She is well-developed. She is ill-appearing.      Comments: Patient appears very frail, thin   HENT:      Head: Normocephalic and atraumatic.      Nose: Nose normal.   Eyes:      Conjunctiva/sclera: Conjunctivae normal.      Pupils: Pupils are equal, round, and reactive to light.   Neck:      Thyroid: No thyromegaly.      Vascular: No JVD.      Trachea: No tracheal deviation.   Cardiovascular:      Rate and Rhythm: Normal rate and regular rhythm.      Heart sounds: Normal heart sounds. No murmur heard.  Pulmonary:      Effort: Pulmonary effort is normal.      Breath sounds: Normal breath sounds. No wheezing.   Chest:          Comments: She has what appears to be a bowing outward of the sternum in the area depicted above.  Abdominal:      General: Bowel sounds are normal. There is no distension.      Palpations: Abdomen is soft.      Tenderness: There is no abdominal tenderness.   Musculoskeletal:         General: Normal range of motion.      Cervical back: Normal range of motion and neck supple.   Lymphadenopathy:      Cervical: No cervical adenopathy.   Skin:     General: Skin is warm and dry.      Findings: No rash.   Neurological:      Mental Status: She is alert and oriented to person, place, and time.      Coordination: Coordination normal.       Encounter  Date    11/17/22    XR Sternum PA & Lateral [[id number]] (Order [id number])  Order  Status: Final result     In Basket Actions    Done  Result Mgmt  View in In Basket  Appointment Information    Display Notes    Follow up after hospitalization. Lump on chest                   PACS Images     Radiology Images    Study Result    Narrative & Impression   Procedure: XR STERNUM 2 OR MORE VIEWS.     History: sternal deformity, new, M95.4 Acquired deformity of  chest and rib.     Comparison: None     Findings:  Four x-rays of the sternum. There is a mid sternal body  deformity. There are inferior portion of the sternum protrudes  anteriorly. There is a cortical defect in this area suspicious  for a fracture.  Surgical clips right axillary region.  Thoracic aorta contains atherosclerotic calcification.  Multiple segments of thoracic vertebral body cement.  Degenerative changes of the cervical spine.  The lungs are poorly visualized but appear to contain irregular  opacities which could be chronic lung disease or pneumonia.     IMPRESSION:  Impression:   Four x-rays of the sternum. There is a mid sternal body  deformity. There are inferior portion of the sternum protrudes  anteriorly. There is a cortical defect in this area suspicious  for a fracture.  The lungs are poorly visualized but appear to contain irregular  opacities which could be chronic lung disease or pneumonia.     Electronically signed by:  Jose C Singer MD  11/17/2022 2:51 PM  CST Workstation: [id number]           Assessment & Plan   Diagnoses and all orders for this visit:    1. Sternal deformity (Primary)  -     XR Sternum PA & Lateral    2. Primary cancer of right upper lobe of lung (HCC)  -     oxyCODONE (ROXICODONE) 15 MG immediate release tablet; Take 1 tablet by mouth Every 4 (Four) Hours As Needed for Moderate Pain.  Dispense: 180 tablet; Refill: 0    3. Chronic obstructive pulmonary disease, unspecified COPD type (HCC)    4. Major depressive disorder,  recurrent episode, moderate degree (HCC)    X-ray of the sternum results as above, concerning for pathologic fracture or metastatic lesion.  We will forward to her cancer doctor as well.  We will change her to oxycodone 15 mg every 4 hours as needed for the pain.    Will add Vraylar for depression along with the Wellbutrin.  Samples are given for 3 mg once daily and she will recheck with me in a month.    She will discontinue the Trelegy as she does not feel it helps.  Gave samples of Breztri inhaler 2 puffs twice a day and she will continue with albuterol nebs or HFA inhaler.          This document has been electronically signed by Mari Bacon MD on November 17, 2022 13:31 CST

## 2022-11-30 ENCOUNTER — OFFICE VISIT (OUTPATIENT)
Dept: WOUND CARE | Facility: HOSPITAL | Age: 62
End: 2022-11-30

## 2022-11-30 PROCEDURE — G0463 HOSPITAL OUTPT CLINIC VISIT: HCPCS

## 2022-12-08 ENCOUNTER — TELEPHONE (OUTPATIENT)
Dept: FAMILY MEDICINE CLINIC | Facility: CLINIC | Age: 62
End: 2022-12-08

## 2022-12-08 ENCOUNTER — OFFICE VISIT (OUTPATIENT)
Dept: FAMILY MEDICINE CLINIC | Facility: CLINIC | Age: 62
End: 2022-12-08

## 2022-12-08 ENCOUNTER — OFFICE VISIT (OUTPATIENT)
Dept: WOUND CARE | Facility: HOSPITAL | Age: 62
End: 2022-12-08

## 2022-12-08 VITALS
DIASTOLIC BLOOD PRESSURE: 68 MMHG | HEART RATE: 64 BPM | SYSTOLIC BLOOD PRESSURE: 108 MMHG | RESPIRATION RATE: 16 BRPM | OXYGEN SATURATION: 88 % | BODY MASS INDEX: 16.66 KG/M2 | WEIGHT: 94 LBS | TEMPERATURE: 98 F | HEIGHT: 63 IN

## 2022-12-08 DIAGNOSIS — C50.512 MALIGNANT NEOPLASM OF LOWER-OUTER QUADRANT OF LEFT FEMALE BREAST, UNSPECIFIED ESTROGEN RECEPTOR STATUS: ICD-10-CM

## 2022-12-08 DIAGNOSIS — R06.00 DYSPNEA, UNSPECIFIED TYPE: ICD-10-CM

## 2022-12-08 DIAGNOSIS — J44.9 CHRONIC OBSTRUCTIVE PULMONARY DISEASE, UNSPECIFIED COPD TYPE: ICD-10-CM

## 2022-12-08 DIAGNOSIS — C50.512 MALIGNANT NEOPLASM OF LOWER-OUTER QUADRANT OF LEFT FEMALE BREAST, UNSPECIFIED ESTROGEN RECEPTOR STATUS: Primary | ICD-10-CM

## 2022-12-08 DIAGNOSIS — C34.11 PRIMARY CANCER OF RIGHT UPPER LOBE OF LUNG: Primary | ICD-10-CM

## 2022-12-08 PROCEDURE — G0463 HOSPITAL OUTPT CLINIC VISIT: HCPCS

## 2022-12-08 PROCEDURE — 99214 OFFICE O/P EST MOD 30 MIN: CPT | Performed by: FAMILY MEDICINE

## 2022-12-08 RX ORDER — BUDESONIDE, GLYCOPYRROLATE, AND FORMOTEROL FUMARATE 160; 9; 4.8 UG/1; UG/1; UG/1
2 AEROSOL, METERED RESPIRATORY (INHALATION) 2 TIMES DAILY
Qty: 10.7 G | Refills: 5 | Status: SHIPPED | OUTPATIENT
Start: 2022-12-08 | End: 2023-03-09

## 2022-12-08 NOTE — PROGRESS NOTES
Subjective   Carin Emmanuel is a 62 y.o. female.  Patient with non-small cell lung CA, left breast cancer, estrogen receptor negative, as well as COPD here today with increasing dyspnea.  Her O2 sats had been noted to be low at wound care, in the 70s at time.  She has had increasing difficulty with daily activities due to the dyspnea.  In fact now she is very sedentary.  We gave her a sample of the Breztri inhaler which she felt worked really well.  She does not feel the powder inhalers worked very well for her including the Trelegy.  She says she is really not able to inhale the powder.  She does use her albuterol inhaler and nebs when needed.    History of Present Illness  COPD   This is a chronic problem. The current episode started more than 1 year ago. The problem occurs daily. The problem has been waxing and waning. Associated symptoms include arthralgias, coughing and fatigue. Pertinent negatives include no abdominal pain, anorexia, change in bowel habit, chest pain, chills, congestion, diaphoresis, fever, headaches, joint swelling, myalgias, nausea, neck pain, numbness, rash, sore throat, urinary symptoms, vertigo, visual change, vomiting or weakness. The symptoms are aggravated by exertion. Treatments tried: albuterol. The treatment provided mild relief.     The following portions of the patient's history were reviewed and updated as appropriate: allergies, current medications, past family history, past medical history, past social history, past surgical history and problem list.    Review of Systems   HENT: Negative.    Respiratory: Positive for shortness of breath.    Cardiovascular: Negative.    Gastrointestinal: Negative.    Musculoskeletal: Negative.    Skin: Negative.    Allergic/Immunologic: Negative for immunocompromised state.   Neurological: Negative for dizziness, tremors, seizures and syncope.   Hematological: Negative.    Psychiatric/Behavioral: Negative for agitation, confusion, dysphoric  mood and sleep disturbance. The patient is not nervous/anxious.    All other systems reviewed and are negative.      Objective    Body mass index is 16.65 kg/m².  Vitals:    12/08/22 1313   BP: 108/68   Pulse: 64   Resp: 16   Temp: 98 °F (36.7 °C)   SpO2: (!) 88%   Answers for HPI/ROS submitted by the patient on 11/13/2022  Please describe your symptoms.: Follow up after hospitalization. New lump in chest  Have you had these symptoms before?: No  How long have you been having these symptoms?: 1-2 weeks  Please describe any probable cause for these symptoms. : Unknown  What is the primary reason for your visit?: Other        Physical Exam  Vitals and nursing note reviewed.   Constitutional:       Appearance: She is well-developed. She is ill-appearing.      Comments: Patient appears very frail, thin   HENT:      Head: Normocephalic and atraumatic.      Nose: Nose normal.   Eyes:      Conjunctiva/sclera: Conjunctivae normal.      Pupils: Pupils are equal, round, and reactive to light.   Neck:      Thyroid: No thyromegaly.      Vascular: No JVD.      Trachea: No tracheal deviation.   Cardiovascular:      Rate and Rhythm: Normal rate and regular rhythm.      Heart sounds: Normal heart sounds. No murmur heard.  Pulmonary:      Effort: Pulmonary effort is normal.      Breath sounds: Normal breath sounds. No wheezing.   Abdominal:      General: Bowel sounds are normal. There is no distension.      Palpations: Abdomen is soft.      Tenderness: There is no abdominal tenderness.   Musculoskeletal:         General: Normal range of motion.      Cervical back: Normal range of motion and neck supple.   Lymphadenopathy:      Cervical: No cervical adenopathy.   Skin:     General: Skin is warm and dry.      Findings: No rash.   Neurological:      Mental Status: She is alert and oriented to person, place, and time.      Coordination: Coordination normal.   Psychiatric:         Mood and Affect: Mood normal.         Behavior: Behavior  normal.         Thought Content: Thought content normal.         Judgment: Judgment normal.           Assessment & Plan   Diagnoses and all orders for this visit:    1. Primary cancer of right upper lobe of lung (HCC) (Primary)    2. Chronic obstructive pulmonary disease, unspecified COPD type (HCC)  -     Budeson-Glycopyrrol-Formoterol (Breztri Aerosphere) 160-9-4.8 MCG/ACT aerosol inhaler; Inhale 2 puffs 2 (Two) Times a Day.  Dispense: 10.7 g; Refill: 5    3. Malignant neoplasm of lower-outer quadrant of left female breast, unspecified estrogen receptor status (HCC)    Will order home oxygen for this patient with multiple lung issues including COPD and lung cancer.  She will need a concentrator and portable oxygen.  We will follow-up accordingly.  We will try to switch from Trelegy to Breztri as this seemed to do better for her.  She is unable to inhale the powder very well.  Continue other treatments for the COPD, as above.    Follow-up with heme-onc regarding lung cancer, breast cancer.          This document has been electronically signed by Mari Bacon MD on December 8, 2022 13:39 CST

## 2022-12-08 NOTE — TELEPHONE ENCOUNTER
Todd from [facility] called regarding the patient. She stated patient was seen in office today and that her O2 was 83%. After letting the patient catch her breath her O2 came to 94%. She is complaining of worsening shortness of breath and does not have oxygen at home. Pulmonology is out of office at the moment and they are unsure when she would be able to get in for an appointment.

## 2022-12-09 ENCOUNTER — DOCUMENTATION (OUTPATIENT)
Dept: FAMILY MEDICINE CLINIC | Facility: CLINIC | Age: 62
End: 2022-12-09

## 2022-12-15 DIAGNOSIS — M54.42 CHRONIC BILATERAL LOW BACK PAIN WITH BILATERAL SCIATICA: ICD-10-CM

## 2022-12-15 DIAGNOSIS — M54.41 CHRONIC BILATERAL LOW BACK PAIN WITH BILATERAL SCIATICA: ICD-10-CM

## 2022-12-15 DIAGNOSIS — C34.11 PRIMARY CANCER OF RIGHT UPPER LOBE OF LUNG: ICD-10-CM

## 2022-12-15 DIAGNOSIS — G89.29 CHRONIC BILATERAL LOW BACK PAIN WITH BILATERAL SCIATICA: ICD-10-CM

## 2022-12-15 RX ORDER — OXYCODONE HYDROCHLORIDE 15 MG/1
15 TABLET ORAL EVERY 4 HOURS PRN
Qty: 180 TABLET | Refills: 0 | Status: SHIPPED | OUTPATIENT
Start: 2022-12-15 | End: 2023-01-11 | Stop reason: SDUPTHER

## 2022-12-15 RX ORDER — HYDROCODONE BITARTRATE AND ACETAMINOPHEN 10; 325 MG/1; MG/1
1 TABLET ORAL EVERY 6 HOURS PRN
Qty: 120 TABLET | Refills: 0 | Status: SHIPPED | OUTPATIENT
Start: 2022-12-15 | End: 2023-01-11

## 2023-01-08 RX ORDER — LINACLOTIDE 145 UG/1
CAPSULE, GELATIN COATED ORAL
Qty: 30 CAPSULE | Refills: 5 | Status: SHIPPED | OUTPATIENT
Start: 2023-01-08

## 2023-01-11 DIAGNOSIS — C34.11 PRIMARY CANCER OF RIGHT UPPER LOBE OF LUNG: ICD-10-CM

## 2023-01-11 RX ORDER — OXYCODONE HYDROCHLORIDE 15 MG/1
15 TABLET ORAL EVERY 4 HOURS PRN
Qty: 180 TABLET | Refills: 0 | Status: SHIPPED | OUTPATIENT
Start: 2023-01-11 | End: 2023-02-15 | Stop reason: SDUPTHER

## 2023-01-17 ENCOUNTER — PRIOR AUTHORIZATION (OUTPATIENT)
Dept: FAMILY MEDICINE CLINIC | Facility: CLINIC | Age: 63
End: 2023-01-17
Payer: COMMERCIAL

## 2023-01-17 NOTE — TELEPHONE ENCOUNTER
1/17/2023  Message  Received: 6 days ago  Case, Cleopatra Obrien, Grisel Cavanaugh RegSched Rep  Patient called she wanto to know if you have gotten a chance to do PA,s on her Linzess and Budeson-Glycopyrrol-Formoterol (Breztri Aerosphere) 160-9-4.8 MCG/ACT aerosol inhaler she is a cancer patient     Verbal Pa submitted to Katty Berry  Pa for Budeson-Glycopyrrol-Formoterol (Breztri Aerosphere) 160-9-4.8 MCG/ACT aerosol inhaler was approved 1/17/2023-1/17/2024  Linzesss did not need a pa medication was picked up 1-9-2023 for a 30 day supply  It does have a high copay but a tier exception is not allowed.    Test claim ran on both meds and they processed as paid

## 2023-01-18 DIAGNOSIS — Z51.81 ENCOUNTER FOR THERAPEUTIC DRUG LEVEL MONITORING: Primary | ICD-10-CM

## 2023-01-18 NOTE — TELEPHONE ENCOUNTER
Carin Emmanuel Ryan: [id number] - PA Case ID: [id number] - Rx #: [id number]  PA Case: [id number], Status: Approved, Coverage Starts on: 1/17/2023 12:00:00 AM, Coverage Ends on: 1/17/2024 12:00:00 AM.  Drug oxyCODONE HCl 15MG tablets  Form [facility] [employer]

## 2023-01-24 RX ORDER — PREDNISONE 10 MG/1
TABLET ORAL
Qty: 20 TABLET | Refills: 1 | Status: SHIPPED | OUTPATIENT
Start: 2023-01-24 | End: 2023-03-09

## 2023-01-26 ENCOUNTER — OFFICE VISIT (OUTPATIENT)
Dept: WOUND CARE | Facility: HOSPITAL | Age: 63
End: 2023-01-26
Payer: COMMERCIAL

## 2023-01-26 PROCEDURE — G0463 HOSPITAL OUTPT CLINIC VISIT: HCPCS

## 2023-02-06 RX ORDER — ARIPIPRAZOLE 5 MG/1
5 TABLET ORAL DAILY
Qty: 30 TABLET | Refills: 5 | Status: SHIPPED | OUTPATIENT
Start: 2023-02-06 | End: 2023-03-09

## 2023-02-07 ENCOUNTER — TELEPHONE (OUTPATIENT)
Dept: FAMILY MEDICINE CLINIC | Facility: CLINIC | Age: 63
End: 2023-02-07
Payer: COMMERCIAL

## 2023-02-07 NOTE — TELEPHONE ENCOUNTER
----- Message from Mari Bacon MD sent at 2/6/2023  8:07 PM CST -----  Let her know I sent in Abilify.  Tell her to take it at bedtime.      ----- Message -----  From: Cleopatra Villalba MA  Sent: 2/6/2023  11:27 AM CST  To: Mari Bacon MD    Patient left a voicemail she said insurance will require a PA on her vrylar she wants to know if there is something similar to this that her insurance will pay for

## 2023-02-08 ENCOUNTER — TELEPHONE (OUTPATIENT)
Dept: FAMILY MEDICINE CLINIC | Facility: CLINIC | Age: 63
End: 2023-02-08
Payer: COMMERCIAL

## 2023-02-08 NOTE — TELEPHONE ENCOUNTER
----- Message from Mari Bacon MD sent at 2/7/2023  4:58 PM CST -----  It would be good for her bones but tell her I would like to have it cleared by her cancer doctor first.  See if she wants to call them and maybe leave a message with her nurses to ask them about her taking this and if they say it is okay will prescribe  ----- Message -----  From: Cleopatra Villalba MA  Sent: 2/7/2023   4:52 PM CST  To: Mari Bacon MD    Spoke with patient she said it was the Dr that did her back surgery   ----- Message -----  From: Mari Bacon MD  Sent: 2/7/2023   4:39 PM CST  To: Cleopatra Villalba MA    Tell her that it says that cancer recent chemotherapy or contraindications to this medication.  Was at her cancer doctor who recommended it?  ----- Message -----  From: Cleopatra Villalba MA  Sent: 2/7/2023   8:13 AM CST  To: Mari Bacon MD    Patient is wanting to know if you will send in Evenity for her bones one of her doctors she sees advise her to ask you about it

## 2023-02-08 NOTE — TELEPHONE ENCOUNTER
Spoke with patient she is going to call her cancer Dr to see if he is ok with the medication she is wanting

## 2023-02-13 ENCOUNTER — TELEPHONE (OUTPATIENT)
Dept: FAMILY MEDICINE CLINIC | Facility: CLINIC | Age: 63
End: 2023-02-13
Payer: COMMERCIAL

## 2023-02-13 RX ORDER — ROMOSOZUMAB-AQQG 105 MG/1.17ML
210 INJECTION, SOLUTION SUBCUTANEOUS
Qty: 2.34 ML | Refills: 11 | Status: SHIPPED | OUTPATIENT
Start: 2023-02-13

## 2023-02-13 NOTE — TELEPHONE ENCOUNTER
----- Message from Mari Bacon MD sent at 2/13/2023 12:59 PM CST -----  Sent it.      ----- Message -----  From: Cleopatra Villalba MA  Sent: 2/13/2023  11:26 AM CST  To: MD Dr Edward Contreras office called he said he is ok with you writing the Evenity

## 2023-02-15 DIAGNOSIS — C34.11 PRIMARY CANCER OF RIGHT UPPER LOBE OF LUNG: ICD-10-CM

## 2023-02-15 RX ORDER — OXYCODONE HYDROCHLORIDE 15 MG/1
15 TABLET ORAL EVERY 4 HOURS PRN
Qty: 180 TABLET | Refills: 0 | Status: SHIPPED | OUTPATIENT
Start: 2023-02-15 | End: 2023-03-20 | Stop reason: SDUPTHER

## 2023-02-23 ENCOUNTER — TELEPHONE (OUTPATIENT)
Dept: FAMILY MEDICINE CLINIC | Facility: CLINIC | Age: 63
End: 2023-02-23
Payer: COMMERCIAL

## 2023-02-23 DIAGNOSIS — M54.2 NECK PAIN: Primary | ICD-10-CM

## 2023-02-23 RX ORDER — METHOCARBAMOL 500 MG/1
500 TABLET, FILM COATED ORAL 3 TIMES DAILY
Qty: 90 TABLET | Refills: 3 | Status: SHIPPED | OUTPATIENT
Start: 2023-02-23

## 2023-02-23 NOTE — TELEPHONE ENCOUNTER
----- Message from Mari Bacon MD sent at 2/23/2023  2:39 PM CST -----  I ordered the neck x-ray.  Tell her she just had x-rays of her back in December and unless something new has happened I do not think there would be much use in doing that one again.  It did show a lot of arthritis and degenerative disks.  ----- Message -----  From: Cleopatra Villalba MA  Sent: 2/23/2023   2:34 PM CST  To: Mari Bacon MD    Patient called she is wanting to know if you would order a neck and back xray she went to the ER last night and they told her to call her back Dr she did he said to call you and see if you would order the xrays she is wanting muscle relaxer sent in the Er Dr santana said sent on a few .

## 2023-02-27 ENCOUNTER — DOCUMENTATION (OUTPATIENT)
Dept: FAMILY MEDICINE CLINIC | Facility: CLINIC | Age: 63
End: 2023-02-27
Payer: COMMERCIAL

## 2023-02-27 NOTE — PROGRESS NOTES
Patient with osteoporosis, Severe COPD,also being treated for lung cancer.  Very high fracture risk.  Her pulmonologist had recommended to her to get on Evenity for osteoporosis.  Advised patient to clear it with her oncologist and we did receive clearance from them to proceed with starting the Evenity for osteoporosis.  Patient would not be a candidate for the oral bisphosphonates.  Her pulmonologist felt, and I agree, that this would be the safest choice for her treatment of the osteoporosis and hopefully reduce her fracture risk        This document has been electronically signed by Mari Bacon MD on February 27, 2023 12:22 CST

## 2023-03-07 ENCOUNTER — TELEPHONE (OUTPATIENT)
Dept: FAMILY MEDICINE CLINIC | Facility: CLINIC | Age: 63
End: 2023-03-07
Payer: COMMERCIAL

## 2023-03-07 NOTE — TELEPHONE ENCOUNTER
Patient contacted the office concerning chest pain. She stated that she has been to the ED recently 2/27/2023 for chest pain and shortness of breath where they performed multiple tests. She was seen at pulmonology 3/06/2023 and was recommended to follow up with her provider regarding this pain. She states the pain is in her chest and back can be severe. She also reports numbness of her right leg, right hand, and left hand. Dr. Bacon was notified of this and advised her to make an appointment to come in the office. She stated understanding and scheduled an appointment for 3/09/2023.

## 2023-03-09 ENCOUNTER — PRIOR AUTHORIZATION (OUTPATIENT)
Dept: FAMILY MEDICINE CLINIC | Facility: CLINIC | Age: 63
End: 2023-03-09

## 2023-03-09 ENCOUNTER — OFFICE VISIT (OUTPATIENT)
Dept: FAMILY MEDICINE CLINIC | Facility: CLINIC | Age: 63
End: 2023-03-09
Payer: COMMERCIAL

## 2023-03-09 VITALS
BODY MASS INDEX: 16.66 KG/M2 | RESPIRATION RATE: 16 BRPM | SYSTOLIC BLOOD PRESSURE: 112 MMHG | DIASTOLIC BLOOD PRESSURE: 60 MMHG | OXYGEN SATURATION: 97 % | WEIGHT: 94 LBS | TEMPERATURE: 97.3 F | HEIGHT: 63 IN | HEART RATE: 58 BPM

## 2023-03-09 DIAGNOSIS — S22.000S COMPRESSION FRACTURE OF THORACIC VERTEBRA, UNSPECIFIED THORACIC VERTEBRAL LEVEL, SEQUELA: Primary | ICD-10-CM

## 2023-03-09 DIAGNOSIS — J44.9 CHRONIC OBSTRUCTIVE PULMONARY DISEASE, UNSPECIFIED COPD TYPE: ICD-10-CM

## 2023-03-09 DIAGNOSIS — R62.7 FAILURE TO THRIVE IN ADULT: ICD-10-CM

## 2023-03-09 DIAGNOSIS — C50.512 MALIGNANT NEOPLASM OF LOWER-OUTER QUADRANT OF LEFT FEMALE BREAST, UNSPECIFIED ESTROGEN RECEPTOR STATUS: ICD-10-CM

## 2023-03-09 DIAGNOSIS — R07.89 CHEST WALL PAIN: Primary | ICD-10-CM

## 2023-03-09 DIAGNOSIS — C34.11 PRIMARY CANCER OF RIGHT UPPER LOBE OF LUNG: ICD-10-CM

## 2023-03-09 DIAGNOSIS — S22.000S COMPRESSION FRACTURE OF THORACIC VERTEBRA, UNSPECIFIED THORACIC VERTEBRAL LEVEL, SEQUELA: ICD-10-CM

## 2023-03-09 PROBLEM — I42.7 CARDIOMYOPATHY SECONDARY TO DRUG: Status: ACTIVE | Noted: 2022-12-19

## 2023-03-09 PROBLEM — I50.22 CHRONIC SYSTOLIC CONGESTIVE HEART FAILURE (HCC): Status: ACTIVE | Noted: 2022-12-19

## 2023-03-09 PROCEDURE — 99214 OFFICE O/P EST MOD 30 MIN: CPT | Performed by: FAMILY MEDICINE

## 2023-03-09 RX ORDER — ALUMINUM ZIRCONIUM OCTACHLOROHYDREX GLY 16 G/100G
4 GEL TOPICAL DAILY
COMMUNITY

## 2023-03-09 RX ORDER — GABAPENTIN 300 MG/1
300 CAPSULE ORAL 3 TIMES DAILY
Qty: 90 CAPSULE | Refills: 2 | Status: SHIPPED | OUTPATIENT
Start: 2023-03-09 | End: 2023-03-23

## 2023-03-09 RX ORDER — TIZANIDINE 4 MG/1
TABLET ORAL
COMMUNITY
Start: 2023-02-23

## 2023-03-09 RX ORDER — SIMVASTATIN 20 MG
TABLET ORAL
COMMUNITY
Start: 2023-03-03

## 2023-03-09 RX ORDER — EMPAGLIFLOZIN 10 MG/1
TABLET, FILM COATED ORAL
COMMUNITY
Start: 2023-03-08

## 2023-03-09 RX ORDER — SACUBITRIL AND VALSARTAN 49; 51 MG/1; MG/1
1 TABLET, FILM COATED ORAL EVERY 12 HOURS SCHEDULED
COMMUNITY
Start: 2023-03-04

## 2023-03-09 RX ORDER — MEGESTROL ACETATE 40 MG/ML
400 SUSPENSION ORAL 2 TIMES DAILY
Qty: 600 ML | Refills: 5 | Status: SHIPPED | OUTPATIENT
Start: 2023-03-09

## 2023-03-09 NOTE — PROGRESS NOTES
Subjective   Carin Emmanuel is a 62 y.o. female.  Patient with non-small cell lung CA, left breast cancer, estrogen receptor negative, significant COPD here today with some chest pain, increased shortness of breath, and follow-up on osteoporosis.    Her pulmonologist had recommended that she get on Evenity for osteoporosis as she is not a candidate for the oral bisphosphonates..  She would be an excellent candidate for this but unfortunately we are having some issues with her insurance covering it.    Recently she has been having some chest pain.  She has seen her cardiologist and had recent studies including an echo, CTA, CT of the chest, and chest x-ray.  She has had 3 vertebral compression fractures and had kyphoplasty recently for these.  She reports that the pain starts in the mid back and comes around the ribs on both sides to the anterior chest.  It is not necessarily exertional, it is somewhat worse with a deep breath.  It is quite severe.  She is on Roxicodone every 4 hours which is not adequate for this pain.    She said that she has been told that it is absolutely essential that she gains weight in order to be able to withstand treatment for the cancers and she feels like her appetite is good but she eats a few bites and then gets full.  She would like to have something to help stimulate appetite.    History of Present Illness  COPD   This is a chronic problem. The current episode started more than 1 year ago. The problem occurs daily. The problem has been waxing and waning. Associated symptoms include arthralgias, coughing and fatigue. Pertinent negatives include no abdominal pain, anorexia, change in bowel habit, chest pain, chills, congestion, diaphoresis, fever, headaches, joint swelling, myalgias, nausea, neck pain, numbness, rash, sore throat, urinary symptoms, vertigo, visual change, vomiting or weakness. The symptoms are aggravated by exertion. Treatments tried: albuterol. The treatment provided  mild relief.     The following portions of the patient's history were reviewed and updated as appropriate: allergies, current medications, past family history, past medical history, past social history, past surgical history and problem list.    Review of Systems   HENT: Negative.    Cardiovascular: Negative.    Gastrointestinal: Negative.    Musculoskeletal: Negative.    Skin: Negative.    Allergic/Immunologic: Negative for immunocompromised state.   Neurological: Negative for dizziness, tremors, seizures and syncope.   Hematological: Negative.    Psychiatric/Behavioral: Negative for agitation, confusion, dysphoric mood and sleep disturbance. The patient is not nervous/anxious.    All other systems reviewed and are negative.      Objective    Body mass index is 16.65 kg/m².  Vitals:    03/09/23 0927   BP: 112/60   Pulse: 58   Resp: 16   Temp: 97.3 °F (36.3 °C)   SpO2: 97%       Physical Exam  Vitals and nursing note reviewed.   Constitutional:       Appearance: She is well-developed. She is ill-appearing.      Comments: Patient appears very frail, thin   HENT:      Head: Normocephalic and atraumatic.      Nose: Nose normal.   Eyes:      Conjunctiva/sclera: Conjunctivae normal.      Pupils: Pupils are equal, round, and reactive to light.   Neck:      Thyroid: No thyromegaly.      Vascular: No JVD.      Trachea: No tracheal deviation.   Cardiovascular:      Rate and Rhythm: Normal rate and regular rhythm.      Heart sounds: Normal heart sounds. No murmur heard.  Pulmonary:      Effort: Pulmonary effort is normal.      Breath sounds: Normal breath sounds. No wheezing.   Abdominal:      General: Bowel sounds are normal. There is no distension.      Palpations: Abdomen is soft.      Tenderness: There is no abdominal tenderness.   Musculoskeletal:         General: Normal range of motion.      Cervical back: Normal range of motion and neck supple.   Lymphadenopathy:      Cervical: No cervical adenopathy.   Skin:      General: Skin is warm and dry.      Findings: No rash.   Neurological:      Mental Status: She is alert and oriented to person, place, and time.      Coordination: Coordination normal.   Psychiatric:         Mood and Affect: Mood normal.         Behavior: Behavior normal.         Thought Content: Thought content normal.         Judgment: Judgment normal.         Assessment & Plan   Diagnoses and all orders for this visit:    1. Chest wall pain (Primary)    2. Compression fracture of thoracic vertebra, unspecified thoracic vertebral level, sequela  -     gabapentin (NEURONTIN) 300 MG capsule; Take 1 capsule by mouth 3 (Three) Times a Day.  Dispense: 90 capsule; Refill: 2    3. Primary cancer of right upper lobe of lung (HCC)    4. Malignant neoplasm of lower-outer quadrant of left female breast, unspecified estrogen receptor status (HCC)    5. Chronic obstructive pulmonary disease, unspecified COPD type (HCC)    6. Failure to thrive in adult  -     megestrol (MEGACE) 40 MG/ML suspension; Take 10 mL by mouth 2 (Two) Times a Day.  Dispense: 600 mL; Refill: 5    Continue on Trelegy, current inhalers and home oxygen.  Follow-up with pulmonology as scheduled.    We will start Megace for appetite stimulation.  We will continue to follow closely.    We will add gabapentin for the chest wall pain issues that which I suspect are referred from the recent thoracic compression fractures and kyphoplasty.  She has had a rather extensive work-up to evaluate for cardiac or pulmonary source which has been negative.  She does have the history of lung cancer but recent CT did not show any local recurrence.  We will continue to follow closely.  May need to increase the dose on the gabapentin.  We will continue also on Roxicodone.    Follow-up with heme-onc regarding lung cancer, breast cancer.    Continue follow-up with cardiology        This document has been electronically signed by Mari Bacon MD on March 9, 2023 09:42 CST        Answers for HPI/ROS submitted by the patient on 3/7/2023  Please describe your symptoms.: Pain in back n chest, numb right leg, numb hands  Have you had these symptoms before?: No  How long have you been having these symptoms?: Greater than 2 weeks  Please list any medications you are currently taking for this condition.: Advil no help  Please describe any probable cause for these symptoms. : None  What is the primary reason for your visit?: Other

## 2023-03-09 NOTE — TELEPHONE ENCOUNTER
Sent a PA into covermymeds for patients Envenity injections waiting on insurance to make a decision

## 2023-03-13 ENCOUNTER — PRIOR AUTHORIZATION (OUTPATIENT)
Dept: FAMILY MEDICINE CLINIC | Facility: CLINIC | Age: 63
End: 2023-03-13
Payer: COMMERCIAL

## 2023-03-20 DIAGNOSIS — R62.7 FAILURE TO THRIVE IN ADULT: Primary | ICD-10-CM

## 2023-03-20 DIAGNOSIS — C34.11 PRIMARY CANCER OF RIGHT UPPER LOBE OF LUNG: ICD-10-CM

## 2023-03-20 RX ORDER — MEGESTROL ACETATE 40 MG/1
40 TABLET ORAL DAILY
Qty: 30 TABLET | Refills: 5 | Status: SHIPPED | OUTPATIENT
Start: 2023-03-20

## 2023-03-20 RX ORDER — OXYCODONE HYDROCHLORIDE 15 MG/1
15 TABLET ORAL EVERY 4 HOURS PRN
Qty: 180 TABLET | Refills: 0 | Status: SHIPPED | OUTPATIENT
Start: 2023-03-20

## 2023-03-21 DIAGNOSIS — J44.9 CHRONIC OBSTRUCTIVE PULMONARY DISEASE, UNSPECIFIED COPD TYPE: ICD-10-CM

## 2023-03-21 RX ORDER — ALBUTEROL SULFATE 90 UG/1
AEROSOL, METERED RESPIRATORY (INHALATION)
Qty: 18 G | Refills: 11 | Status: SHIPPED | OUTPATIENT
Start: 2023-03-21

## 2023-03-23 DIAGNOSIS — C34.11 PRIMARY CANCER OF RIGHT UPPER LOBE OF LUNG: Primary | ICD-10-CM

## 2023-03-23 DIAGNOSIS — R53.1 GENERAL WEAKNESS: ICD-10-CM

## 2023-03-23 RX ORDER — GABAPENTIN 600 MG/1
600 TABLET ORAL 3 TIMES DAILY
Qty: 90 TABLET | Refills: 2 | Status: SHIPPED | OUTPATIENT
Start: 2023-03-23

## 2023-03-24 ENCOUNTER — HOME HEALTH ADMISSION (OUTPATIENT)
Dept: HOME HEALTH SERVICES | Facility: HOME HEALTHCARE | Age: 63
End: 2023-03-24
Payer: COMMERCIAL

## 2023-03-27 ENCOUNTER — HOME CARE VISIT (OUTPATIENT)
Dept: HOME HEALTH SERVICES | Facility: CLINIC | Age: 63
End: 2023-03-27
Payer: COMMERCIAL

## 2023-03-27 PROCEDURE — G0151 HHCP-SERV OF PT,EA 15 MIN: HCPCS

## 2023-03-29 NOTE — HOME HEALTH
Patient is a 62 y/o female who was seen for OASIS SOC with physical therapy evaluation on 3/27/23 secondary to recent hospitalization w/ increased SOB and weakness resulting in short hospital stay to help stabilize breathing pattern and return patient to baseline. While hospitalized, patient reporting increased pain in R LE and inabiltiy to walk normally seconadry to R LE giving way on her. Patient also presented w/ vertebral fx in thoracic spine while in hospital that occured when reaching for item while in the bed but not reporting any pain from this at the current time. Patient was previously able to ambulate indep, complete all ADLs, drive, walk moderate distances and get out in community and help with dog sitting service. Patient currently walking w/ use of rollator and CGA, unable to stand w/o AD support, sitting most of the day, requring assistance for all ADLs, and significnat increased weakness w/ all activity. PT home care referral to help patient attmpet to reach PLOF.     Focus of care: PT focus of care for this patient will include but but be limited to: bilateral LE strength training to improve functional mobility, preserve muscle tone and increase muscular endurance to help reduce levels of fatigue and improve activity tolerance, gait and transfer training to aid in reduction of fall risk and improve patient safety w/ mobility, balance training to prevent falls over course care and improve functional balance outcome scores, focus on spinal mobility, core strengthening, hip mobility and gait activitiy to help reduce pain and increase functional mobility secondary to fractures of thoracic vertebrae.     PMH includes: lung nodule, malignant neoplasm of L breast, COPD, supplemental O2 use, vertebral fx    Upon physical therapy evalaution, patient presents with strength, transfer, balance, endurance, and gait deficits as noted in physical therapy assessment. These deficits are limiting patient's overall  functional capacity w/ independence, activity, ADLs, and IADLs. Patient will benefit from skilled physical therapy to address the defcitis noted above and recorded over course of care to allow patient to return to PLOF and max level of independence.    Subjective: Binh states that she is having a hard time getting around. States that her R leg pants to give out on her all the time when she is standing or walking and constantly has burning and numbness in her R leg shooting from hip all the way to toes at times. States that it is hard for her to get comfortable and needs somebody walking with her to and from bathroom and bedroom because she is fearful that she will fall. Wants to get back to walking on her own again and return to outpatient PT to help her contnue working on her indep.

## 2023-03-30 NOTE — CASE COMMUNICATION
"[facility]  / Case Conference Note  Medical Necessity and focus of care: Patient is a 64 y/o female who was seen for OASIS SOC with physical therapy evaluation on 3/27/23 secondary to recent hospitalization w/ increased SOB and weakness resulting in short hospital stay to help stabilize breathing pattern and return patient to baseline. While hospitalized, patient reporting increased pain in R LE and inabiltiy to walk normally seconadry to R  LE giving way on her. Patient also presented w/ vertebral fx in thoracic spine while in hospital that occured when reaching for item while in the bed but not reporting any pain from this at the current time. Patient was previously able to ambulate indep, complete all ADLs, drive, walk moderate distances and get out in community and help with dog sitting service. Patient currently walking w/ use of rollator and CGA, unable to stand w/o  AD support, sitting most of the day, requring assistance for all ADLs, and significnat increased weakness w/ all activity. PT home care referral to help patient attmpet to reach PLOF. PT focus of care for this patient will include but but be limited to: bilateral LE strength training to improve functional mobility, preserve muscle tone and increase muscular endurance to help reduce levels of fatigue and improve activity tolerance, gait  and transfer training to aid in reduction of fall risk and improve patient safety w/ mobility, balance training to prevent falls over course care and improve functional balance outcome scores, focus on spinal mobility, core strengthening, hip mobility and gait activitiy to help reduce pain and increase functional mobility secondary to fractures of thoracic vertebrae.   Caregiver Status: lives with spouse and daughter comes in to assist  during the day  Patient's goal(s): \"get back to walking on my own and doing everything myself\"  GG Discharge Goal: 5  Medication [place]: n/a   Environmental/ Physical issues: 2 dogs in " the home, cluttered walkways, rugs, supplemental O2 use  Any additional needs: n/a    Based upon record review and collaboration conference, the recommended frequency for this patient is   SN-----  PT----- 2w3, 1w1  OT----  ST-----  HHA---  MSW---  Provide r___Dr. Bacon_______ Notified @ ___3/27/23______ and agrees with POC     Please verify your eval  scores: (Answer the scores  that pertain to your area of focus remove the others)    3   2

## 2023-03-31 ENCOUNTER — HOME CARE VISIT (OUTPATIENT)
Dept: HOME HEALTH SERVICES | Facility: CLINIC | Age: 63
End: 2023-03-31
Payer: COMMERCIAL

## 2023-04-02 DIAGNOSIS — F33.1 MAJOR DEPRESSIVE DISORDER, RECURRENT EPISODE, MODERATE DEGREE: ICD-10-CM

## 2023-04-03 RX ORDER — BUPROPION HYDROCHLORIDE 300 MG/1
300 TABLET ORAL DAILY
Qty: 90 TABLET | Refills: 1 | Status: SHIPPED | OUTPATIENT
Start: 2023-04-03

## 2023-04-03 NOTE — CASE COMMUNICATION
Patient missed a PT home visit from [facility] on 3/31/23.     Reason: Issues with insurance coverage. PT unable to make visit d/t lack of proper insurance coverage. Will attempt to make visits following week if insurance approves skilled home care services at that time.        For your records only.   As per home health protocol, MD must be notified of missed/cancelled visits; therefore the prescribed frequency was not met.

## 2023-04-04 ENCOUNTER — HOME CARE VISIT (OUTPATIENT)
Dept: HOME HEALTH SERVICES | Facility: CLINIC | Age: 63
End: 2023-04-04
Payer: COMMERCIAL

## 2023-04-05 VITALS
RESPIRATION RATE: 20 BRPM | DIASTOLIC BLOOD PRESSURE: 52 MMHG | SYSTOLIC BLOOD PRESSURE: 100 MMHG | OXYGEN SATURATION: 94 % | HEART RATE: 78 BPM | TEMPERATURE: 97.4 F

## 2023-04-06 ENCOUNTER — TELEPHONE (OUTPATIENT)
Dept: FAMILY MEDICINE CLINIC | Facility: CLINIC | Age: 63
End: 2023-04-06
Payer: COMMERCIAL

## 2023-04-06 NOTE — TELEPHONE ENCOUNTER
Spoke with Rianna from Home Health patients insurance is not in network they have only saw her once they will keep referral opened for a few days so patient can find out for sure if they are not covered or covered. Rianna ( [facility] ) has tried calling the patient already to see if she has been able to talk to the insurance but patient did not answer

## 2023-04-06 NOTE — CASE COMMUNICATION
Patient missed a PT visit from [facility] on 4/4/23.     Reason: Patient continues to have issues with insurance coverage. No visit completed d/t visit not being covered by patient insurance. .       For your records only.   As per home health protocol, MD must be notified of missed/cancelled visits; therefore the prescribed frequency was not met.

## 2023-04-07 ENCOUNTER — HOME CARE VISIT (OUTPATIENT)
Dept: HOME HEALTH SERVICES | Facility: CLINIC | Age: 63
End: 2023-04-07
Payer: COMMERCIAL

## 2023-04-10 ENCOUNTER — HOME CARE VISIT (OUTPATIENT)
Dept: HOME HEALTH SERVICES | Facility: CLINIC | Age: 63
End: 2023-04-10
Payer: COMMERCIAL

## 2023-04-10 ENCOUNTER — OFFICE VISIT (OUTPATIENT)
Dept: FAMILY MEDICINE CLINIC | Facility: CLINIC | Age: 63
End: 2023-04-10
Payer: COMMERCIAL

## 2023-04-10 ENCOUNTER — PRIOR AUTHORIZATION (OUTPATIENT)
Dept: FAMILY MEDICINE CLINIC | Facility: CLINIC | Age: 63
End: 2023-04-10
Payer: COMMERCIAL

## 2023-04-10 VITALS
DIASTOLIC BLOOD PRESSURE: 62 MMHG | BODY MASS INDEX: 16.66 KG/M2 | WEIGHT: 94 LBS | HEART RATE: 70 BPM | HEIGHT: 63 IN | TEMPERATURE: 97.1 F | SYSTOLIC BLOOD PRESSURE: 108 MMHG | OXYGEN SATURATION: 99 %

## 2023-04-10 DIAGNOSIS — G60.9 IDIOPATHIC PERIPHERAL NEUROPATHY: ICD-10-CM

## 2023-04-10 DIAGNOSIS — D50.9 IRON DEFICIENCY ANEMIA, UNSPECIFIED IRON DEFICIENCY ANEMIA TYPE: ICD-10-CM

## 2023-04-10 DIAGNOSIS — C50.512 MALIGNANT NEOPLASM OF LOWER-OUTER QUADRANT OF LEFT FEMALE BREAST, UNSPECIFIED ESTROGEN RECEPTOR STATUS: ICD-10-CM

## 2023-04-10 DIAGNOSIS — R53.83 OTHER FATIGUE: ICD-10-CM

## 2023-04-10 DIAGNOSIS — C34.11 PRIMARY CANCER OF RIGHT UPPER LOBE OF LUNG: Primary | ICD-10-CM

## 2023-04-10 RX ORDER — ARIPIPRAZOLE 5 MG/1
1 TABLET ORAL DAILY
COMMUNITY
Start: 2023-04-04

## 2023-04-10 RX ORDER — SYRINGE W-NEEDLE,DISPOSAB,3 ML 25GX5/8"
SYRINGE, EMPTY DISPOSABLE MISCELLANEOUS
Qty: 2 EACH | Refills: 5 | Status: SHIPPED | OUTPATIENT
Start: 2023-04-10

## 2023-04-10 RX ORDER — CYANOCOBALAMIN 1000 UG/ML
1000 INJECTION, SOLUTION INTRAMUSCULAR; SUBCUTANEOUS
Qty: 2 ML | Refills: 5 | Status: SHIPPED | OUTPATIENT
Start: 2023-04-10

## 2023-04-10 RX ORDER — CYANOCOBALAMIN 1000 UG/ML
1000 INJECTION, SOLUTION INTRAMUSCULAR; SUBCUTANEOUS ONCE
Status: COMPLETED | OUTPATIENT
Start: 2023-04-10 | End: 2023-04-10

## 2023-04-10 RX ORDER — ALENDRONATE SODIUM 70 MG/1
70 TABLET ORAL
COMMUNITY
Start: 2023-03-21

## 2023-04-10 RX ADMIN — CYANOCOBALAMIN 1000 MCG: 1000 INJECTION, SOLUTION INTRAMUSCULAR; SUBCUTANEOUS at 10:42

## 2023-04-10 NOTE — PROGRESS NOTES
Subjective   Carin Emmanuel is a 63 y.o. female.  Patient with non-small cell lung CA, left breast cancer, estrogen receptor negative, significant COPD here today with some chest pain, increased shortness of breath for follow-up.  We have still not been able to get her Evenity approved.  Her orthopedic doctor had recommended this initially due to severe osteoporosis.  She is not a good candidate for the bisphosphonates with the cancers, chemo and radiation.  She does feel that the gabapentin has been helping.  She continues to have some neuropathy pain with burning in the lower legs.  This occurs particular she tries to stand up and walk.    History of Present Illness  COPD   This is a chronic problem. The current episode started more than 1 year ago. The problem occurs daily. The problem has been waxing and waning. Associated symptoms include arthralgias, coughing and fatigue. Pertinent negatives include no abdominal pain, anorexia, change in bowel habit, chest pain, chills, congestion, diaphoresis, fever, headaches, joint swelling, myalgias, nausea, neck pain, numbness, rash, sore throat, urinary symptoms, vertigo, visual change, vomiting or weakness. The symptoms are aggravated by exertion. Treatments tried: albuterol. The treatment provided mild relief.     The following portions of the patient's history were reviewed and updated as appropriate: allergies, current medications, past family history, past medical history, past social history, past surgical history and problem list.    Review of Systems   HENT: Negative.    Respiratory: Positive for shortness of breath.    Cardiovascular: Negative.    Gastrointestinal: Negative.    Musculoskeletal: Negative.    Skin: Negative.    Allergic/Immunologic: Negative for immunocompromised state.   Neurological: Negative for dizziness, tremors, seizures and syncope.   Hematological: Negative.    Psychiatric/Behavioral: Negative for agitation, confusion, dysphoric mood and  sleep disturbance. The patient is not nervous/anxious.    All other systems reviewed and are negative.      Objective    Body mass index is 16.65 kg/m².  Vitals:    04/10/23 1013   BP: 108/62   Pulse: 70   Temp: 97.1 °F (36.2 °C)   SpO2: 99%     Patient on oxygen at 3 L by nasal cannula    Physical Exam  Vitals and nursing note reviewed.   Constitutional:       Appearance: She is well-developed. She is ill-appearing.      Comments: Patient appears very frail, thin   HENT:      Head: Normocephalic and atraumatic.      Nose: Nose normal.   Eyes:      Conjunctiva/sclera: Conjunctivae normal.      Pupils: Pupils are equal, round, and reactive to light.   Neck:      Thyroid: No thyromegaly.      Vascular: No JVD.      Trachea: No tracheal deviation.   Cardiovascular:      Rate and Rhythm: Normal rate and regular rhythm.      Heart sounds: Normal heart sounds. No murmur heard.  Pulmonary:      Effort: Pulmonary effort is normal.      Breath sounds: Normal breath sounds. No wheezing.   Abdominal:      General: Bowel sounds are normal. There is no distension.      Palpations: Abdomen is soft.      Tenderness: There is no abdominal tenderness.   Musculoskeletal:         General: Normal range of motion.      Cervical back: Normal range of motion and neck supple.   Lymphadenopathy:      Cervical: No cervical adenopathy.   Skin:     General: Skin is warm and dry.      Findings: No rash.   Neurological:      Mental Status: She is alert and oriented to person, place, and time.      Coordination: Coordination normal.      Gait: Gait abnormal.      Comments: Appears very weak, in wheelchair today.   Psychiatric:         Mood and Affect: Mood normal.         Behavior: Behavior normal.         Thought Content: Thought content normal.         Judgment: Judgment normal.         Assessment & Plan   Diagnoses and all orders for this visit:    1. Primary cancer of right upper lobe of lung (Primary)    2. Malignant neoplasm of lower-outer  "quadrant of left female breast, unspecified estrogen receptor status    3. Other fatigue  -     cyanocobalamin injection 1,000 mcg    4. Idiopathic peripheral neuropathy  -     cyanocobalamin injection 1,000 mcg    5. Iron deficiency anemia, unspecified iron deficiency anemia type  -     cyanocobalamin 1000 MCG/ML injection; Inject 1 mL into the appropriate muscle as directed by prescriber Every 14 (Fourteen) Days.  Dispense: 2 mL; Refill: 5  -     cyanocobalamin injection 1,000 mcg    Other orders  -     Syringe 25G X 1\" 3 ML misc; Use for B12 every 14 days  Dispense: 2 each; Refill: 5    Follow-up with heme-onc regarding cancer treatments, as above    We will attempt to continue to get the Evenity approved for osteoporosis as she is not a good candidate for bisphosphonates and I feel this is a much safer choice for her.    We will continue present pain management for cancer related pain issue let me know when refills are due.    She will continue on home oxygen, as above.    We will start B12 shots as this may help with the neuropathy pain as well as energy    Continue gabapentin for neuropathy pain.        This document has been electronically signed by Mari Bacon MD on April 10, 2023 10:27 CDT             "

## 2023-04-10 NOTE — TELEPHONE ENCOUNTER
PA for Evenity was submitted to [employer] ref PA Ryan [id number]. DX used was M81.0 Osteoporosis without current fracture.    PA for Evenity was APPROVED effective 4/10/23 to 4/9/24. Patient and pharmacy advised.     Note: Quantity limit of 2 pens per month.

## 2023-04-11 NOTE — CASE COMMUNICATION
Patient missed a PT visit from [facility] on 4/7/23.     Reason: Patient continues to have issues w/ insurance coverage at this time. Patient is out-of-network for home care services and trying to determine if she will be able to have any home care services provided. WIll follow up with patient next week to determine insurance eligibility at that time..       For your records only.   As per home health protocol, MD must be notifi ed of missed/cancelled visits; therefore the prescribed frequency was not met.

## 2023-04-11 NOTE — CASE COMMUNICATION
Pt missed a physical therapy appt on 4/10/23 due to not home and no answer to phone call. Message left without return call.

## 2023-04-12 ENCOUNTER — HOME CARE VISIT (OUTPATIENT)
Dept: HOME HEALTH SERVICES | Facility: HOME HEALTHCARE | Age: 63
End: 2023-04-12
Payer: COMMERCIAL

## 2023-04-17 ENCOUNTER — TELEPHONE (OUTPATIENT)
Dept: FAMILY MEDICINE CLINIC | Facility: CLINIC | Age: 63
End: 2023-04-17
Payer: COMMERCIAL

## 2023-04-17 ENCOUNTER — HOME CARE VISIT (OUTPATIENT)
Dept: HOME HEALTH SERVICES | Facility: HOME HEALTHCARE | Age: 63
End: 2023-04-17
Payer: COMMERCIAL

## 2023-04-17 NOTE — TELEPHONE ENCOUNTER
Spoke with the patient about the Envinty injection as of right now no one is doing that type of infusion and the copay may be to high of  a cost for the patient the patient is going to talk to the bone dr to see what he can do.

## 2023-04-19 NOTE — HOME HEALTH
Binh DC from skilled home care services d/t insurance not being in-network for home care services. No further visits able to be completed d/t no insurance coverage for services and patient being DC from skilled home care services at this time. Pateint informed of DC and agreeable at this time.

## 2023-04-19 NOTE — CASE COMMUNICATION
Home Health Physical Therapy Agency Discharge (4/17/23)    CONDITION/PROBLEMS AT TIME OF ADMISSION: Patient is a 62 y/o female who was seen for [facility] with physical therapy evaluation on 3/27/23 secondary to recent hospitalization w/ increased SOB and weakness resulting in short hospital stay to help stabilize breathing pattern and return patient to baseline. While hospitalized, patient reporting increased pain in R LE and inabiltiy  to walk normally seconadry to R LE giving way on her. Patient also presented w/ vertebral fx in thoracic spine while in hospital that occured when reaching for item while in the bed but not reporting any pain from this at the current time. Patient was previously able to ambulate indep, complete all ADLs, drive, walk moderate distances and get out in community and help with dog sitting service. Patient currently walking w/ use of rollato r and CGA, unable to stand w/o AD support, sitting most of the day, requring assistance for all ADLs, and significnat increased weakness w/ all activity. PT home care referral to help patient attmpet to reach PLOF  SUMMARY OF CARE PROVIDED:   PT admission visit only d/t insurance coverage out-of-network; no further skilled visits able to be performed  PROGRESSION TOWARDS GOALS: unable to progress towards goasl d/t no further skilled vis its performed  ANY FALLS/ED VISITS/HOSPITALIZATION(S): none  DISCHARGE REASON:  insurance coverage out-of-network  UPCOMING MD APPTS/COUMADIN CLINIC: unknown

## 2023-04-20 DIAGNOSIS — C34.11 PRIMARY CANCER OF RIGHT UPPER LOBE OF LUNG: ICD-10-CM

## 2023-04-20 RX ORDER — OXYCODONE HYDROCHLORIDE 15 MG/1
15 TABLET ORAL EVERY 4 HOURS PRN
Qty: 180 TABLET | Refills: 0 | Status: SHIPPED | OUTPATIENT
Start: 2023-04-20

## 2023-05-02 DIAGNOSIS — I10 ESSENTIAL HYPERTENSION: ICD-10-CM

## 2023-05-02 RX ORDER — METOPROLOL SUCCINATE 25 MG/1
TABLET, EXTENDED RELEASE ORAL
Qty: 30 TABLET | Refills: 5 | Status: SHIPPED | OUTPATIENT
Start: 2023-05-02

## 2023-05-04 DIAGNOSIS — J44.9 CHRONIC OBSTRUCTIVE PULMONARY DISEASE, UNSPECIFIED COPD TYPE: Primary | ICD-10-CM

## 2023-05-04 RX ORDER — METHYLPREDNISOLONE 4 MG/1
TABLET ORAL
Qty: 1 EACH | Refills: 0 | Status: SHIPPED | OUTPATIENT
Start: 2023-05-04

## 2023-05-11 DIAGNOSIS — J44.9 CHRONIC OBSTRUCTIVE PULMONARY DISEASE, UNSPECIFIED COPD TYPE: ICD-10-CM

## 2023-05-11 RX ORDER — METHYLPREDNISOLONE 4 MG/1
TABLET ORAL
Qty: 21 EACH | OUTPATIENT
Start: 2023-05-11

## 2023-05-18 DIAGNOSIS — C34.11 PRIMARY CANCER OF RIGHT UPPER LOBE OF LUNG: ICD-10-CM

## 2023-05-18 RX ORDER — OXYCODONE HYDROCHLORIDE 15 MG/1
15 TABLET ORAL EVERY 4 HOURS PRN
Qty: 180 TABLET | Refills: 0 | Status: SHIPPED | OUTPATIENT
Start: 2023-05-18

## 2023-06-11 DIAGNOSIS — C34.11 PRIMARY CANCER OF RIGHT UPPER LOBE OF LUNG: ICD-10-CM

## 2023-06-12 RX ORDER — GABAPENTIN 600 MG/1
TABLET ORAL
Qty: 90 TABLET | Refills: 2 | Status: SHIPPED | OUTPATIENT
Start: 2023-06-12

## 2023-06-14 DIAGNOSIS — C34.11 PRIMARY CANCER OF RIGHT UPPER LOBE OF LUNG: ICD-10-CM

## 2023-06-14 RX ORDER — OXYCODONE HYDROCHLORIDE 15 MG/1
15 TABLET ORAL EVERY 4 HOURS PRN
Qty: 180 TABLET | Refills: 0 | Status: SHIPPED | OUTPATIENT
Start: 2023-06-14

## 2023-07-24 DIAGNOSIS — C34.11 PRIMARY CANCER OF RIGHT UPPER LOBE OF LUNG: ICD-10-CM

## 2023-07-24 RX ORDER — OXYCODONE HYDROCHLORIDE 15 MG/1
15 TABLET ORAL EVERY 4 HOURS PRN
Qty: 180 TABLET | Refills: 0 | Status: SHIPPED | OUTPATIENT
Start: 2023-07-24

## 2023-08-06 RX ORDER — LINACLOTIDE 145 UG/1
CAPSULE, GELATIN COATED ORAL
Qty: 30 CAPSULE | Refills: 5 | Status: SHIPPED | OUTPATIENT
Start: 2023-08-06

## 2023-08-18 DIAGNOSIS — R62.7 FAILURE TO THRIVE IN ADULT: ICD-10-CM

## 2023-08-18 RX ORDER — MEGESTROL ACETATE 40 MG/1
40 TABLET ORAL DAILY
Qty: 30 TABLET | Refills: 5 | Status: SHIPPED | OUTPATIENT
Start: 2023-08-18

## 2023-08-23 DIAGNOSIS — C34.11 PRIMARY CANCER OF RIGHT UPPER LOBE OF LUNG: ICD-10-CM

## 2023-08-23 RX ORDER — OXYCODONE HYDROCHLORIDE 15 MG/1
15 TABLET ORAL EVERY 4 HOURS PRN
Qty: 180 TABLET | Refills: 0 | Status: SHIPPED | OUTPATIENT
Start: 2023-08-23 | End: 2023-08-24 | Stop reason: SDUPTHER

## 2023-08-24 DIAGNOSIS — C34.11 PRIMARY CANCER OF RIGHT UPPER LOBE OF LUNG: ICD-10-CM

## 2023-08-24 RX ORDER — OXYCODONE HYDROCHLORIDE 15 MG/1
15 TABLET ORAL EVERY 4 HOURS PRN
Qty: 180 TABLET | Refills: 0 | Status: SHIPPED | OUTPATIENT
Start: 2023-08-24

## 2023-08-29 DIAGNOSIS — C34.11 PRIMARY CANCER OF RIGHT UPPER LOBE OF LUNG: ICD-10-CM

## 2023-09-11 DIAGNOSIS — C34.11 PRIMARY CANCER OF RIGHT UPPER LOBE OF LUNG: ICD-10-CM

## 2023-09-11 RX ORDER — GABAPENTIN 600 MG/1
TABLET ORAL
Qty: 90 TABLET | Refills: 2 | Status: SHIPPED | OUTPATIENT
Start: 2023-09-11

## 2023-09-20 DIAGNOSIS — F33.1 MAJOR DEPRESSIVE DISORDER, RECURRENT EPISODE, MODERATE DEGREE: ICD-10-CM

## 2023-09-20 DIAGNOSIS — D50.9 IRON DEFICIENCY ANEMIA, UNSPECIFIED IRON DEFICIENCY ANEMIA TYPE: ICD-10-CM

## 2023-09-20 RX ORDER — CYANOCOBALAMIN 1000 UG/ML
INJECTION, SOLUTION INTRAMUSCULAR; SUBCUTANEOUS
Qty: 2 ML | Refills: 5 | Status: SHIPPED | OUTPATIENT
Start: 2023-09-20

## 2023-09-20 RX ORDER — BUPROPION HYDROCHLORIDE 300 MG/1
300 TABLET ORAL DAILY
Qty: 90 TABLET | Refills: 1 | Status: SHIPPED | OUTPATIENT
Start: 2023-09-20